# Patient Record
Sex: FEMALE | Race: WHITE | NOT HISPANIC OR LATINO | URBAN - METROPOLITAN AREA
[De-identification: names, ages, dates, MRNs, and addresses within clinical notes are randomized per-mention and may not be internally consistent; named-entity substitution may affect disease eponyms.]

---

## 2018-09-26 ENCOUNTER — CONVERSION ENCOUNTER (OUTPATIENT)
Dept: GENERAL RADIOLOGY | Facility: HOSPITAL | Age: 68
End: 2018-09-26

## 2019-03-27 ENCOUNTER — OFFICE VISIT CONVERTED (OUTPATIENT)
Dept: NEUROLOGY | Facility: CLINIC | Age: 69
End: 2019-03-27
Attending: PSYCHIATRY & NEUROLOGY

## 2019-03-27 ENCOUNTER — CONVERSION ENCOUNTER (OUTPATIENT)
Dept: NEUROLOGY | Facility: CLINIC | Age: 69
End: 2019-03-27

## 2019-03-28 ENCOUNTER — HOSPITAL ENCOUNTER (OUTPATIENT)
Dept: LAB | Facility: HOSPITAL | Age: 69
Discharge: HOME | End: 2019-03-28

## 2019-03-28 LAB
ALBUMIN SERPL-MCNC: 3.9 G/DL (ref 3.5–5)
ALBUMIN/GLOB SERPL: 1.4 {RATIO} (ref 1.4–2.6)
ALP SERPL-CCNC: 52 U/L (ref 43–160)
ALT SERPL-CCNC: 43 U/L (ref 10–40)
ANION GAP SERPL CALC-SCNC: 16 MMOL/L (ref 8–19)
AST SERPL-CCNC: 53 U/L (ref 15–50)
BASOPHILS # BLD AUTO: 0.03 10*3/UL (ref 0–0.2)
BASOPHILS NFR BLD AUTO: 0.4 % (ref 0–3)
BILIRUB SERPL-MCNC: 0.34 MG/DL (ref 0.2–1.3)
BUN SERPL-MCNC: 22 MG/DL (ref 5–25)
BUN/CREAT SERPL: 14 {RATIO} (ref 6–20)
CALCIUM SERPL-MCNC: 9.3 MG/DL (ref 8.7–10.4)
CHLORIDE SERPL-SCNC: 104 MMOL/L (ref 99–111)
CONV ABS IMM GRAN: 0.06 10*3/UL (ref 0–0.2)
CONV CO2: 25 MMOL/L (ref 22–32)
CONV IMMATURE GRAN: 0.8 % (ref 0–1.8)
CONV TOTAL PROTEIN: 6.6 G/DL (ref 6.3–8.2)
CREAT UR-MCNC: 1.58 MG/DL (ref 0.5–0.9)
DEPRECATED RDW RBC AUTO: 49.9 FL (ref 36.4–46.3)
EOSINOPHIL # BLD AUTO: 0.48 10*3/UL (ref 0–0.7)
EOSINOPHIL # BLD AUTO: 6.8 % (ref 0–7)
ERYTHROCYTE [DISTWIDTH] IN BLOOD BY AUTOMATED COUNT: 14.1 % (ref 11.7–14.4)
GFR SERPLBLD BASED ON 1.73 SQ M-ARVRAT: 33 ML/MIN/{1.73_M2}
GLOBULIN UR ELPH-MCNC: 2.7 G/DL (ref 2–3.5)
GLUCOSE SERPL-MCNC: 93 MG/DL (ref 65–99)
HBA1C MFR BLD: 11.6 G/DL (ref 12–16)
HCT VFR BLD AUTO: 37.3 % (ref 37–47)
HCYS SERPL-SCNC: 15.9 UMOL/L (ref 3.7–13.9)
LYMPHOCYTES # BLD AUTO: 0.38 10*3/UL (ref 1–5)
MCH RBC QN AUTO: 29.7 PG (ref 27–31)
MCHC RBC AUTO-ENTMCNC: 31.1 G/DL (ref 33–37)
MCV RBC AUTO: 95.6 FL (ref 81–99)
MONOCYTES # BLD AUTO: 0.45 10*3/UL (ref 0.2–1.2)
MONOCYTES NFR BLD AUTO: 6.3 % (ref 3–10)
NEUTROPHILS # BLD AUTO: 5.7 10*3/UL (ref 2–8)
NEUTROPHILS NFR BLD AUTO: 80.3 % (ref 30–85)
NRBC CBCN: 0 % (ref 0–0.7)
OSMOLALITY SERPL CALC.SUM OF ELEC: 295 MOSM/KG (ref 273–304)
PLATELET # BLD AUTO: 200 10*3/UL (ref 130–400)
PMV BLD AUTO: 12.4 FL (ref 9.4–12.3)
POTASSIUM SERPL-SCNC: 3.9 MMOL/L (ref 3.5–5.3)
RBC # BLD AUTO: 3.9 10*6/UL (ref 4.2–5.4)
SODIUM SERPL-SCNC: 141 MMOL/L (ref 135–147)
TSH SERPL-ACNC: 3.32 M[IU]/L (ref 0.27–4.2)
VARIANT LYMPHS NFR BLD MANUAL: 5.4 % (ref 20–45)
VIT B12 SERPL-MCNC: 1309 PG/ML (ref 211–911)
WBC # BLD AUTO: 7.1 10*3/UL (ref 4.8–10.8)

## 2019-03-31 ENCOUNTER — HOSPITAL ENCOUNTER (OUTPATIENT)
Dept: URGENT CARE | Facility: CLINIC | Age: 69
Discharge: HOME | End: 2019-03-31
Attending: NURSE PRACTITIONER

## 2019-04-10 ENCOUNTER — CONVERSION ENCOUNTER (OUTPATIENT)
Dept: SURGERY | Facility: CLINIC | Age: 69
End: 2019-04-10

## 2019-04-10 ENCOUNTER — OFFICE VISIT CONVERTED (OUTPATIENT)
Dept: UROLOGY | Facility: CLINIC | Age: 69
End: 2019-04-10
Attending: UROLOGY

## 2019-04-17 ENCOUNTER — HOSPITAL ENCOUNTER (OUTPATIENT)
Dept: CT IMAGING | Facility: HOSPITAL | Age: 69
Discharge: HOME | End: 2019-04-17
Attending: UROLOGY

## 2019-04-25 ENCOUNTER — PROCEDURE VISIT CONVERTED (OUTPATIENT)
Dept: UROLOGY | Facility: CLINIC | Age: 69
End: 2019-04-25
Attending: UROLOGY

## 2019-04-29 ENCOUNTER — OFFICE VISIT CONVERTED (OUTPATIENT)
Dept: FAMILY MEDICINE CLINIC | Facility: CLINIC | Age: 69
End: 2019-04-29
Attending: NURSE PRACTITIONER

## 2019-04-29 ENCOUNTER — CONVERSION ENCOUNTER (OUTPATIENT)
Dept: FAMILY MEDICINE CLINIC | Facility: CLINIC | Age: 69
End: 2019-04-29

## 2019-04-30 ENCOUNTER — HOSPITAL ENCOUNTER (OUTPATIENT)
Dept: PERIOP | Facility: HOSPITAL | Age: 69
Setting detail: HOSPITAL OUTPATIENT SURGERY
Discharge: HOME | End: 2019-04-30
Attending: UROLOGY

## 2019-05-08 ENCOUNTER — CONVERSION ENCOUNTER (OUTPATIENT)
Dept: OTHER | Facility: HOSPITAL | Age: 69
End: 2019-05-08

## 2019-05-08 ENCOUNTER — HOSPITAL ENCOUNTER (OUTPATIENT)
Dept: GENERAL RADIOLOGY | Facility: HOSPITAL | Age: 69
Discharge: HOME | End: 2019-05-08
Attending: UROLOGY

## 2019-05-08 ENCOUNTER — OFFICE VISIT CONVERTED (OUTPATIENT)
Dept: UROLOGY | Facility: CLINIC | Age: 69
End: 2019-05-08
Attending: UROLOGY

## 2019-05-15 ENCOUNTER — HOSPITAL ENCOUNTER (OUTPATIENT)
Dept: PERIOP | Facility: HOSPITAL | Age: 69
Setting detail: HOSPITAL OUTPATIENT SURGERY
Discharge: HOME | End: 2019-05-15
Attending: UROLOGY

## 2019-05-23 ENCOUNTER — OFFICE VISIT CONVERTED (OUTPATIENT)
Dept: UROLOGY | Facility: CLINIC | Age: 69
End: 2019-05-23
Attending: UROLOGY

## 2019-06-11 ENCOUNTER — HOSPITAL ENCOUNTER (OUTPATIENT)
Dept: OTHER | Facility: HOSPITAL | Age: 69
Setting detail: RECURRING SERIES
Discharge: HOME | End: 2019-06-30
Attending: INTERNAL MEDICINE

## 2019-06-11 LAB
ALBUMIN SERPL-MCNC: 3.9 G/DL (ref 3.5–5)
ALBUMIN/GLOB SERPL: 1.4 {RATIO} (ref 1.4–2.6)
ALP SERPL-CCNC: 47 U/L (ref 43–160)
ALT SERPL-CCNC: 15 U/L (ref 10–40)
ANION GAP SERPL CALC-SCNC: 15 MMOL/L (ref 8–19)
AST SERPL-CCNC: 19 U/L (ref 15–50)
BASOPHILS # BLD AUTO: 0.04 10*3/UL (ref 0–0.2)
BASOPHILS NFR BLD AUTO: 0.5 % (ref 0–3)
BILIRUB SERPL-MCNC: 0.29 MG/DL (ref 0.2–1.3)
BUN SERPL-MCNC: 19 MG/DL (ref 5–25)
BUN/CREAT SERPL: 23 {RATIO} (ref 6–20)
CALCIUM SERPL-MCNC: 9.1 MG/DL (ref 8.7–10.4)
CHLORIDE SERPL-SCNC: 106 MMOL/L (ref 99–111)
CONV ABS IMM GRAN: 0.03 10*3/UL (ref 0–0.2)
CONV CO2: 27 MMOL/L (ref 22–32)
CONV IMMATURE GRAN: 0.4 % (ref 0–1.8)
CONV TOTAL PROTEIN: 6.6 G/DL (ref 6.3–8.2)
CREAT UR-MCNC: 0.83 MG/DL (ref 0.5–0.9)
DEPRECATED RDW RBC AUTO: 48.3 FL (ref 36.4–46.3)
EOSINOPHIL # BLD AUTO: 0.13 10*3/UL (ref 0–0.7)
EOSINOPHIL # BLD AUTO: 1.7 % (ref 0–7)
ERYTHROCYTE [DISTWIDTH] IN BLOOD BY AUTOMATED COUNT: 13.6 % (ref 11.7–14.4)
GFR SERPLBLD BASED ON 1.73 SQ M-ARVRAT: >60 ML/MIN/{1.73_M2}
GLOBULIN UR ELPH-MCNC: 2.7 G/DL (ref 2–3.5)
GLUCOSE SERPL-MCNC: 90 MG/DL (ref 65–99)
HBA1C MFR BLD: 11.1 G/DL (ref 12–16)
HCT VFR BLD AUTO: 35.3 % (ref 37–47)
LYMPHOCYTES # BLD AUTO: 1.86 10*3/UL (ref 1–5)
MCH RBC QN AUTO: 30.2 PG (ref 27–31)
MCHC RBC AUTO-ENTMCNC: 31.4 G/DL (ref 33–37)
MCV RBC AUTO: 96.2 FL (ref 81–99)
MONOCYTES # BLD AUTO: 0.54 10*3/UL (ref 0.2–1.2)
MONOCYTES NFR BLD AUTO: 7.1 % (ref 3–10)
NEUTROPHILS # BLD AUTO: 4.99 10*3/UL (ref 2–8)
NEUTROPHILS NFR BLD AUTO: 65.8 % (ref 30–85)
NRBC CBCN: 0 % (ref 0–0.7)
OSMOLALITY SERPL CALC.SUM OF ELEC: 300 MOSM/KG (ref 273–304)
PLATELET # BLD AUTO: 220 10*3/UL (ref 130–400)
PMV BLD AUTO: 10.9 FL (ref 9.4–12.3)
POTASSIUM SERPL-SCNC: 4.2 MMOL/L (ref 3.5–5.3)
RBC # BLD AUTO: 3.67 10*6/UL (ref 4.2–5.4)
SODIUM SERPL-SCNC: 144 MMOL/L (ref 135–147)
VARIANT LYMPHS NFR BLD MANUAL: 24.5 % (ref 20–45)
WBC # BLD AUTO: 7.59 10*3/UL (ref 4.8–10.8)

## 2019-06-12 ENCOUNTER — HOSPITAL ENCOUNTER (OUTPATIENT)
Dept: GENERAL RADIOLOGY | Facility: HOSPITAL | Age: 69
Discharge: HOME | End: 2019-06-12
Attending: NURSE PRACTITIONER

## 2019-06-19 LAB
ALBUMIN SERPL-MCNC: 4 G/DL (ref 3.5–5)
ALBUMIN/GLOB SERPL: 1.6 {RATIO} (ref 1.4–2.6)
ALP SERPL-CCNC: 48 U/L (ref 43–160)
ALT SERPL-CCNC: 13 U/L (ref 10–40)
ANION GAP SERPL CALC-SCNC: 15 MMOL/L (ref 8–19)
AST SERPL-CCNC: 20 U/L (ref 15–50)
BASOPHILS # BLD AUTO: 0.02 10*3/UL (ref 0–0.2)
BASOPHILS NFR BLD AUTO: 0.3 % (ref 0–3)
BILIRUB SERPL-MCNC: 0.35 MG/DL (ref 0.2–1.3)
BUN SERPL-MCNC: 17 MG/DL (ref 5–25)
BUN/CREAT SERPL: 18 {RATIO} (ref 6–20)
CALCIUM SERPL-MCNC: 9.4 MG/DL (ref 8.7–10.4)
CHLORIDE SERPL-SCNC: 108 MMOL/L (ref 99–111)
CONV ABS IMM GRAN: 0.02 10*3/UL (ref 0–0.54)
CONV CO2: 25 MMOL/L (ref 22–32)
CONV EOSINOPHILS PERCENT BY MANUAL COUNT: 2.3 % (ref 0–7)
CONV IMMATURE GRAN: 0.3 % (ref 0–0.4)
CONV TOTAL PROTEIN: 6.5 G/DL (ref 6.3–8.2)
CREAT UR-MCNC: 0.93 MG/DL (ref 0.5–0.9)
EOSINOPHIL # BLD MANUAL: 0.15 10*3/UL (ref 0–0.7)
ERYTHROCYTE [DISTWIDTH] IN BLOOD BY AUTOMATED COUNT: 13.5 % (ref 11.5–14.5)
ERYTHROCYTE [DISTWIDTH] IN BLOOD BY AUTOMATED COUNT: 47.5 FL
GFR SERPLBLD BASED ON 1.73 SQ M-ARVRAT: >60 ML/MIN/{1.73_M2}
GLOBULIN UR ELPH-MCNC: 2.5 G/DL (ref 2–3.5)
GLUCOSE SERPL-MCNC: 97 MG/DL (ref 65–99)
HBA1C MFR BLD: 10.9 G/DL (ref 12–16)
HCT VFR BLD AUTO: 33.9 % (ref 37–47)
LYMPHOCYTES # BLD AUTO: 1.62 10*3/UL (ref 1–5)
LYMPHOCYTES NFR BLD AUTO: 24.6 % (ref 20–45)
MCH RBC QN AUTO: 30.4 PG (ref 27–31)
MCHC RBC AUTO-ENTMCNC: 32.2 G/DL (ref 33–37)
MCV RBC AUTO: 94.4 FL (ref 81–99)
MONOCYTES # BLD AUTO: 0.53 10*3/UL (ref 0.2–1.2)
MONOCYTES NFR BLD MANUAL: 8.1 % (ref 3–10)
NEUTROPHILS # BLD AUTO: 4.24 10*3/UL (ref 2–8)
NEUTROPHILS NFR BLD MANUAL: 64.4 % (ref 30–85)
OSMOLALITY SERPL CALC.SUM OF ELEC: 299 MOSM/KG (ref 273–304)
PLATELET # BLD AUTO: 223 10*3/UL (ref 130–400)
PMV BLD AUTO: 11 FL (ref 7.4–10.4)
POTASSIUM SERPL-SCNC: 3.9 MMOL/L (ref 3.5–5.3)
RBC MORPH BLD: 3.59 10*6/UL (ref 4.2–5.4)
SODIUM SERPL-SCNC: 144 MMOL/L (ref 135–147)
WBC # BLD AUTO: 6.58 10*3/UL (ref 4.8–10.8)

## 2019-06-26 LAB
BASOPHILS # BLD AUTO: 0.01 10*3/UL (ref 0–0.2)
BASOPHILS NFR BLD AUTO: 0.3 % (ref 0–3)
CONV ABS IMM GRAN: 0.01 10*3/UL (ref 0–0.54)
CONV EOSINOPHILS PERCENT BY MANUAL COUNT: 1.6 % (ref 0–7)
CONV IMMATURE GRAN: 0.3 % (ref 0–0.4)
EOSINOPHIL # BLD MANUAL: 0.06 10*3/UL (ref 0–0.7)
ERYTHROCYTE [DISTWIDTH] IN BLOOD BY AUTOMATED COUNT: 12.9 % (ref 11.5–14.5)
ERYTHROCYTE [DISTWIDTH] IN BLOOD BY AUTOMATED COUNT: 45.9 FL
HBA1C MFR BLD: 11.3 G/DL (ref 12–16)
HCT VFR BLD AUTO: 36.1 % (ref 37–47)
LYMPHOCYTES # BLD AUTO: 1.59 10*3/UL (ref 1–5)
LYMPHOCYTES NFR BLD AUTO: 42.7 % (ref 20–45)
MCH RBC QN AUTO: 29.7 PG (ref 27–31)
MCHC RBC AUTO-ENTMCNC: 31.3 G/DL (ref 33–37)
MCV RBC AUTO: 95 FL (ref 81–99)
MONOCYTES # BLD AUTO: 0.13 10*3/UL (ref 0.2–1.2)
MONOCYTES NFR BLD MANUAL: 3.5 % (ref 3–10)
NEUTROPHILS # BLD AUTO: 1.92 10*3/UL (ref 2–8)
NEUTROPHILS NFR BLD MANUAL: 51.6 % (ref 30–85)
PLATELET # BLD AUTO: 108 10*3/UL (ref 130–400)
PMV BLD AUTO: 12 FL (ref 7.4–10.4)
RBC MORPH BLD: 3.8 10*6/UL (ref 4.2–5.4)
WBC # BLD AUTO: 3.72 10*3/UL (ref 4.8–10.8)

## 2019-07-10 ENCOUNTER — HOSPITAL ENCOUNTER (OUTPATIENT)
Dept: OTHER | Facility: HOSPITAL | Age: 69
Setting detail: RECURRING SERIES
Discharge: HOME | End: 2019-07-31
Attending: INTERNAL MEDICINE

## 2019-07-10 LAB
ALBUMIN SERPL-MCNC: 4.2 G/DL (ref 3.5–5)
ALBUMIN/GLOB SERPL: 1.6 {RATIO} (ref 1.4–2.6)
ALP SERPL-CCNC: 54 U/L (ref 43–160)
ALT SERPL-CCNC: 20 U/L (ref 10–40)
ANION GAP SERPL CALC-SCNC: 14 MMOL/L (ref 8–19)
AST SERPL-CCNC: 23 U/L (ref 15–50)
BASOPHILS # BLD AUTO: 0.02 10*3/UL (ref 0–0.2)
BASOPHILS NFR BLD AUTO: 0.7 % (ref 0–3)
BILIRUB SERPL-MCNC: <0.15 MG/DL (ref 0.2–1.3)
BUN SERPL-MCNC: 12 MG/DL (ref 5–25)
BUN/CREAT SERPL: 10 {RATIO} (ref 6–20)
CALCIUM SERPL-MCNC: 9.3 MG/DL (ref 8.7–10.4)
CHLORIDE SERPL-SCNC: 108 MMOL/L (ref 99–111)
CONV ABS IMM GRAN: 0.01 10*3/UL (ref 0–0.2)
CONV CO2: 27 MMOL/L (ref 22–32)
CONV IMMATURE GRAN: 0.4 % (ref 0–1.8)
CONV TOTAL PROTEIN: 6.8 G/DL (ref 6.3–8.2)
CREAT UR-MCNC: 1.15 MG/DL (ref 0.5–0.9)
DEPRECATED RDW RBC AUTO: 43.6 FL (ref 36.4–46.3)
EOSINOPHIL # BLD AUTO: 0.13 10*3/UL (ref 0–0.7)
EOSINOPHIL # BLD AUTO: 4.7 % (ref 0–7)
ERYTHROCYTE [DISTWIDTH] IN BLOOD BY AUTOMATED COUNT: 13 % (ref 11.7–14.4)
GFR SERPLBLD BASED ON 1.73 SQ M-ARVRAT: 48 ML/MIN/{1.73_M2}
GLOBULIN UR ELPH-MCNC: 2.6 G/DL (ref 2–3.5)
GLUCOSE SERPL-MCNC: 91 MG/DL (ref 65–99)
HBA1C MFR BLD: 9.5 G/DL (ref 12–16)
HCT VFR BLD AUTO: 29.9 % (ref 37–47)
LYMPHOCYTES # BLD AUTO: 1.48 10*3/UL (ref 1–5)
MCH RBC QN AUTO: 30.3 PG (ref 27–31)
MCHC RBC AUTO-ENTMCNC: 31.8 G/DL (ref 33–37)
MCV RBC AUTO: 95.2 FL (ref 81–99)
MONOCYTES # BLD AUTO: 0.49 10*3/UL (ref 0.2–1.2)
MONOCYTES NFR BLD AUTO: 17.7 % (ref 3–10)
NEUTROPHILS # BLD AUTO: 0.64 10*3/UL (ref 2–8)
NEUTROPHILS NFR BLD AUTO: 23.1 % (ref 30–85)
NRBC CBCN: 0 % (ref 0–0.7)
OSMOLALITY SERPL CALC.SUM OF ELEC: 299 MOSM/KG (ref 273–304)
PLATELET # BLD AUTO: 374 10*3/UL (ref 130–400)
PMV BLD AUTO: 10.5 FL (ref 9.4–12.3)
POTASSIUM SERPL-SCNC: 3.8 MMOL/L (ref 3.5–5.3)
RBC # BLD AUTO: 3.14 10*6/UL (ref 4.2–5.4)
SODIUM SERPL-SCNC: 145 MMOL/L (ref 135–147)
VARIANT LYMPHS NFR BLD MANUAL: 53.4 % (ref 20–45)
WBC # BLD AUTO: 2.77 10*3/UL (ref 4.8–10.8)

## 2019-07-17 LAB
ALBUMIN SERPL-MCNC: 4.3 G/DL (ref 3.5–5)
ALBUMIN/GLOB SERPL: 1.8 {RATIO} (ref 1.4–2.6)
ALP SERPL-CCNC: 50 U/L (ref 43–160)
ALT SERPL-CCNC: 16 U/L (ref 10–40)
ANION GAP SERPL CALC-SCNC: 17 MMOL/L (ref 8–19)
AST SERPL-CCNC: 25 U/L (ref 15–50)
BASOPHILS # BLD AUTO: 0.04 10*3/UL (ref 0–0.2)
BASOPHILS NFR BLD AUTO: 0.6 % (ref 0–3)
BILIRUB SERPL-MCNC: <0.15 MG/DL (ref 0.2–1.3)
BUN SERPL-MCNC: 12 MG/DL (ref 5–25)
BUN/CREAT SERPL: 13 {RATIO} (ref 6–20)
CALCIUM SERPL-MCNC: 9.5 MG/DL (ref 8.7–10.4)
CHLORIDE SERPL-SCNC: 105 MMOL/L (ref 99–111)
CONV ABS IMM GRAN: 0.34 10*3/UL (ref 0–0.54)
CONV CO2: 23 MMOL/L (ref 22–32)
CONV EOSINOPHILS PERCENT BY MANUAL COUNT: 1.8 % (ref 0–7)
CONV IMMATURE GRAN: 5.2 % (ref 0–0.4)
CONV TOTAL PROTEIN: 6.7 G/DL (ref 6.3–8.2)
CREAT UR-MCNC: 0.96 MG/DL (ref 0.5–0.9)
EOSINOPHIL # BLD MANUAL: 0.12 10*3/UL (ref 0–0.7)
ERYTHROCYTE [DISTWIDTH] IN BLOOD BY AUTOMATED COUNT: 13.8 % (ref 11.5–14.5)
ERYTHROCYTE [DISTWIDTH] IN BLOOD BY AUTOMATED COUNT: 46.1 FL
GFR SERPLBLD BASED ON 1.73 SQ M-ARVRAT: 60 ML/MIN/{1.73_M2}
GLOBULIN UR ELPH-MCNC: 2.4 G/DL (ref 2–3.5)
GLUCOSE SERPL-MCNC: 91 MG/DL (ref 65–99)
HBA1C MFR BLD: 10.2 G/DL (ref 12–16)
HCT VFR BLD AUTO: 31.9 % (ref 37–47)
LYMPHOCYTES # BLD AUTO: 2.24 10*3/UL (ref 1–5)
LYMPHOCYTES NFR BLD AUTO: 34.5 % (ref 20–45)
MCH RBC QN AUTO: 30.4 PG (ref 27–31)
MCHC RBC AUTO-ENTMCNC: 32 G/DL (ref 33–37)
MCV RBC AUTO: 94.9 FL (ref 81–99)
MONOCYTES # BLD AUTO: 1.29 10*3/UL (ref 0.2–1.2)
MONOCYTES NFR BLD MANUAL: 19.8 % (ref 3–10)
NEUTROPHILS # BLD AUTO: 2.47 10*3/UL (ref 2–8)
NEUTROPHILS NFR BLD MANUAL: 38.1 % (ref 30–85)
OSMOLALITY SERPL CALC.SUM OF ELEC: 291 MOSM/KG (ref 273–304)
PLATELET # BLD AUTO: 466 10*3/UL (ref 130–400)
PMV BLD AUTO: 9.9 FL (ref 7.4–10.4)
POTASSIUM SERPL-SCNC: 4.2 MMOL/L (ref 3.5–5.3)
RBC MORPH BLD: 3.36 10*6/UL (ref 4.2–5.4)
SODIUM SERPL-SCNC: 141 MMOL/L (ref 135–147)
WBC # BLD AUTO: 6.5 10*3/UL (ref 4.8–10.8)

## 2019-07-24 LAB
BASOPHILS # BLD AUTO: 0.02 10*3/UL (ref 0–0.2)
BASOPHILS NFR BLD AUTO: 0.6 % (ref 0–3)
CONV ABS IMM GRAN: 0.01 10*3/UL (ref 0–0.54)
CONV EOSINOPHILS PERCENT BY MANUAL COUNT: 0.9 % (ref 0–7)
CONV IMMATURE GRAN: 0.3 % (ref 0–0.4)
EOSINOPHIL # BLD MANUAL: 0.03 10*3/UL (ref 0–0.7)
ERYTHROCYTE [DISTWIDTH] IN BLOOD BY AUTOMATED COUNT: 13.5 % (ref 11.5–14.5)
ERYTHROCYTE [DISTWIDTH] IN BLOOD BY AUTOMATED COUNT: 45.6 FL
HBA1C MFR BLD: 11 G/DL (ref 12–16)
HCT VFR BLD AUTO: 34.3 % (ref 37–47)
LYMPHOCYTES # BLD AUTO: 1.47 10*3/UL (ref 1–5)
LYMPHOCYTES NFR BLD AUTO: 44.1 % (ref 20–45)
MCH RBC QN AUTO: 29.7 PG (ref 27–31)
MCHC RBC AUTO-ENTMCNC: 32.1 G/DL (ref 33–37)
MCV RBC AUTO: 92.7 FL (ref 81–99)
MONOCYTES # BLD AUTO: 0.15 10*3/UL (ref 0.2–1.2)
MONOCYTES NFR BLD MANUAL: 4.5 % (ref 3–10)
NEUTROPHILS # BLD AUTO: 1.65 10*3/UL (ref 2–8)
NEUTROPHILS NFR BLD MANUAL: 49.6 % (ref 30–85)
PLATELET # BLD AUTO: 197 10*3/UL (ref 130–400)
PMV BLD AUTO: 11 FL (ref 7.4–10.4)
RBC MORPH BLD: 3.7 10*6/UL (ref 4.2–5.4)
WBC # BLD AUTO: 3.33 10*3/UL (ref 4.8–10.8)

## 2019-08-08 ENCOUNTER — HOSPITAL ENCOUNTER (OUTPATIENT)
Dept: CARDIOLOGY | Facility: HOSPITAL | Age: 69
Discharge: HOME | End: 2019-08-08
Attending: NURSE PRACTITIONER

## 2019-08-08 ENCOUNTER — HOSPITAL ENCOUNTER (OUTPATIENT)
Dept: OTHER | Facility: HOSPITAL | Age: 69
Setting detail: RECURRING SERIES
Discharge: HOME | End: 2019-08-31
Attending: INTERNAL MEDICINE

## 2019-08-08 LAB
ALBUMIN SERPL-MCNC: 4 G/DL (ref 3.5–5)
ALBUMIN/GLOB SERPL: 1.7 {RATIO} (ref 1.4–2.6)
ALP SERPL-CCNC: 110 U/L (ref 43–160)
ALT SERPL-CCNC: 9 U/L (ref 10–40)
ANION GAP SERPL CALC-SCNC: 14 MMOL/L (ref 8–19)
AST SERPL-CCNC: 19 U/L (ref 15–50)
BASOPHILS # BLD AUTO: 0.16 10*3/UL (ref 0–0.2)
BASOPHILS # BLD: 2 % (ref 0–3)
BASOPHILS NFR BLD AUTO: 0.8 % (ref 0–3)
BILIRUB SERPL-MCNC: 0.23 MG/DL (ref 0.2–1.3)
BUN SERPL-MCNC: 14 MG/DL (ref 5–25)
BUN/CREAT SERPL: 11 {RATIO} (ref 6–20)
C3 FRG RBC-MCNC: ABNORMAL
CALCIUM SERPL-MCNC: 9 MG/DL (ref 8.7–10.4)
CHLORIDE SERPL-SCNC: 106 MMOL/L (ref 99–111)
CONV ABS BANDS: 0 % (ref 1–5)
CONV ABS IMM GRAN: 1.89 10*3/UL (ref 0–0.2)
CONV ANISOCYTES: SLIGHT
CONV ATYPICAL LYMPHOCYTES: 3 % (ref 0–5)
CONV CO2: 26 MMOL/L (ref 22–32)
CONV IMMATURE GRAN: 9.3 % (ref 0–1.8)
CONV SEGMENTED NEUTROPHILS: 76 % (ref 45–70)
CONV TOTAL PROTEIN: 6.3 G/DL (ref 6.3–8.2)
CREAT UR-MCNC: 1.3 MG/DL (ref 0.5–0.9)
DEPRECATED RDW RBC AUTO: 48.9 FL (ref 36.4–46.3)
EOSINOPHIL # BLD AUTO: 0.11 10*3/UL (ref 0–0.7)
EOSINOPHIL # BLD AUTO: 0.5 % (ref 0–7)
EOSINOPHIL NFR BLD AUTO: 2 % (ref 0–7)
ERYTHROCYTE [DISTWIDTH] IN BLOOD BY AUTOMATED COUNT: 15.3 % (ref 11.7–14.4)
GFR SERPLBLD BASED ON 1.73 SQ M-ARVRAT: 41 ML/MIN/{1.73_M2}
GLOBULIN UR ELPH-MCNC: 2.3 G/DL (ref 2–3.5)
GLUCOSE SERPL-MCNC: 104 MG/DL (ref 65–99)
HCT VFR BLD AUTO: 26 % (ref 37–47)
HGB BLD-MCNC: 8.4 G/DL (ref 12–16)
LYMPHOCYTES # BLD AUTO: 2.1 10*3/UL (ref 1–5)
LYMPHOCYTES NFR BLD AUTO: 10.3 % (ref 20–45)
MCH RBC QN AUTO: 29.8 PG (ref 27–31)
MCHC RBC AUTO-ENTMCNC: 32.3 G/DL (ref 33–37)
MCV RBC AUTO: 92.2 FL (ref 81–99)
METAMYELOCYTES NFR BLD MANUAL: 2 %
MONOCYTES # BLD AUTO: 2.22 10*3/UL (ref 0.2–1.2)
MONOCYTES NFR BLD AUTO: 10.9 % (ref 3–10)
MONOCYTES NFR BLD MANUAL: 5 % (ref 3–10)
MYELOCYTES TOTAL PER COUNTED LEUKOCYTES BY MANUAL COUN: 3 %
NEUTROPHILS # BLD AUTO: 13.89 10*3/UL (ref 2–8)
NEUTROPHILS NFR BLD AUTO: 68.2 % (ref 30–85)
NRBC CBCN: 0.1 % (ref 0–0.7)
NUC CELL # PRT MANUAL: 1 /100{WBCS}
OSMOLALITY SERPL CALC.SUM OF ELEC: 295 MOSM/KG (ref 273–304)
OVALOCYTES BLD QL SMEAR: SLIGHT
PLAT MORPH BLD: NORMAL
PLATELET # BLD AUTO: 273 10*3/UL (ref 130–400)
PMV BLD AUTO: 10.7 FL (ref 9.4–12.3)
POIKILOCYTOSIS BLD QL SMEAR: SLIGHT
POLYCHROMASIA BLD QL SMEAR: SLIGHT
POTASSIUM SERPL-SCNC: 4 MMOL/L (ref 3.5–5.3)
RBC # BLD AUTO: 2.82 10*6/UL (ref 4.2–5.4)
SMALL PLATELETS BLD QL SMEAR: ADEQUATE
SODIUM SERPL-SCNC: 142 MMOL/L (ref 135–147)
STOMATOCYTES BLD QL SMEAR: SLIGHT
VARIANT LYMPHS NFR BLD MANUAL: 7 % (ref 20–45)
WBC # BLD AUTO: 20.37 10*3/UL (ref 4.8–10.8)

## 2019-08-14 LAB
ABO GROUP BLD: NORMAL
BASOPHILS # BLD AUTO: 0.05 10*3/UL (ref 0–0.2)
BASOPHILS NFR BLD AUTO: 1.6 % (ref 0–3)
BLD GP AB SCN SERPL QL: NORMAL
CONV ABD CONTROL: NORMAL
CONV ABS IMM GRAN: 0.01 10*3/UL (ref 0–0.54)
CONV EOSINOPHILS PERCENT BY MANUAL COUNT: 1.9 % (ref 0–7)
CONV IMMATURE GRAN: 0.3 % (ref 0–0.4)
EOSINOPHIL # BLD MANUAL: 0.06 10*3/UL (ref 0–0.7)
ERYTHROCYTE [DISTWIDTH] IN BLOOD BY AUTOMATED COUNT: 15.3 % (ref 11.5–14.5)
ERYTHROCYTE [DISTWIDTH] IN BLOOD BY AUTOMATED COUNT: 50 FL
HBA1C MFR BLD: 7.6 G/DL (ref 12–16)
HCT VFR BLD AUTO: 23.5 % (ref 37–47)
LYMPHOCYTES # BLD AUTO: 1.12 10*3/UL (ref 1–5)
LYMPHOCYTES NFR BLD AUTO: 36.2 % (ref 20–45)
MCH RBC QN AUTO: 30 PG (ref 27–31)
MCHC RBC AUTO-ENTMCNC: 32.3 G/DL (ref 33–37)
MCV RBC AUTO: 92.9 FL (ref 81–99)
MONOCYTES # BLD AUTO: 0.17 10*3/UL (ref 0.2–1.2)
MONOCYTES NFR BLD MANUAL: 5.5 % (ref 3–10)
NEUTROPHILS # BLD AUTO: 1.68 10*3/UL (ref 2–8)
NEUTROPHILS NFR BLD MANUAL: 54.5 % (ref 30–85)
PLATELET # BLD AUTO: 246 10*3/UL (ref 130–400)
PMV BLD AUTO: 10.4 FL (ref 7.4–10.4)
RBC MORPH BLD: 2.53 10*6/UL (ref 4.2–5.4)
RH BLD: NORMAL
WBC # BLD AUTO: 3.09 10*3/UL (ref 4.8–10.8)

## 2019-08-16 ENCOUNTER — HOSPITAL ENCOUNTER (OUTPATIENT)
Dept: INFUSION THERAPY | Facility: HOSPITAL | Age: 69
Discharge: HOME | End: 2019-08-16
Attending: NURSE PRACTITIONER

## 2019-08-26 ENCOUNTER — OFFICE VISIT CONVERTED (OUTPATIENT)
Dept: FAMILY MEDICINE CLINIC | Facility: CLINIC | Age: 69
End: 2019-08-26
Attending: NURSE PRACTITIONER

## 2019-08-26 ENCOUNTER — CONVERSION ENCOUNTER (OUTPATIENT)
Dept: FAMILY MEDICINE CLINIC | Facility: CLINIC | Age: 69
End: 2019-08-26

## 2019-09-04 ENCOUNTER — HOSPITAL ENCOUNTER (OUTPATIENT)
Dept: ONCOLOGY | Facility: HOSPITAL | Age: 69
Setting detail: RECURRING SERIES
Discharge: HOME | End: 2019-09-30
Attending: INTERNAL MEDICINE

## 2019-09-04 LAB
ALBUMIN SERPL-MCNC: 3.9 G/DL (ref 3.5–5)
ALBUMIN/GLOB SERPL: 2 {RATIO} (ref 1.4–2.6)
ALP SERPL-CCNC: 65 U/L (ref 43–160)
ALT SERPL-CCNC: 13 U/L (ref 10–40)
ANION GAP SERPL CALC-SCNC: 16 MMOL/L (ref 8–19)
AST SERPL-CCNC: 29 U/L (ref 15–50)
BASOPHILS # BLD AUTO: 0.05 10*3/UL (ref 0–0.2)
BASOPHILS NFR BLD AUTO: 0.7 % (ref 0–3)
BILIRUB SERPL-MCNC: 0.69 MG/DL (ref 0.2–1.3)
BUN SERPL-MCNC: 19 MG/DL (ref 5–25)
BUN/CREAT SERPL: 17 {RATIO} (ref 6–20)
CALCIUM SERPL-MCNC: 9.2 MG/DL (ref 8.7–10.4)
CHLORIDE SERPL-SCNC: 102 MMOL/L (ref 99–111)
CONV ABS IMM GRAN: 0.09 10*3/UL (ref 0–0.54)
CONV CO2: 24 MMOL/L (ref 22–32)
CONV EOSINOPHILS PERCENT BY MANUAL COUNT: 0.7 % (ref 0–7)
CONV IMMATURE GRAN: 1.2 % (ref 0–0.4)
CONV TOTAL PROTEIN: 5.9 G/DL (ref 6.3–8.2)
CREAT UR-MCNC: 1.12 MG/DL (ref 0.5–0.9)
EOSINOPHIL # BLD MANUAL: 0.05 10*3/UL (ref 0–0.7)
ERYTHROCYTE [DISTWIDTH] IN BLOOD BY AUTOMATED COUNT: 17.6 % (ref 11.5–14.5)
ERYTHROCYTE [DISTWIDTH] IN BLOOD BY AUTOMATED COUNT: 59.5 FL
GFR SERPLBLD BASED ON 1.73 SQ M-ARVRAT: 49 ML/MIN/{1.73_M2}
GLOBULIN UR ELPH-MCNC: 2 G/DL (ref 2–3.5)
GLUCOSE SERPL-MCNC: 95 MG/DL (ref 65–99)
HBA1C MFR BLD: 8.2 G/DL (ref 12–16)
HCT VFR BLD AUTO: 25.2 % (ref 37–47)
LYMPHOCYTES # BLD AUTO: 1.25 10*3/UL (ref 1–5)
LYMPHOCYTES NFR BLD AUTO: 17.1 % (ref 20–45)
MCH RBC QN AUTO: 30.8 PG (ref 27–31)
MCHC RBC AUTO-ENTMCNC: 32.5 G/DL (ref 33–37)
MCV RBC AUTO: 94.7 FL (ref 81–99)
MONOCYTES # BLD AUTO: 1.35 10*3/UL (ref 0.2–1.2)
MONOCYTES NFR BLD MANUAL: 18.5 % (ref 3–10)
NEUTROPHILS # BLD AUTO: 4.52 10*3/UL (ref 2–8)
NEUTROPHILS NFR BLD MANUAL: 61.8 % (ref 30–85)
OSMOLALITY SERPL CALC.SUM OF ELEC: 288 MOSM/KG (ref 273–304)
PLATELET # BLD AUTO: 174 10*3/UL (ref 130–400)
PMV BLD AUTO: 10.2 FL (ref 7.4–10.4)
POTASSIUM SERPL-SCNC: 4.2 MMOL/L (ref 3.5–5.3)
RBC MORPH BLD: 2.66 10*6/UL (ref 4.2–5.4)
SODIUM SERPL-SCNC: 138 MMOL/L (ref 135–147)
WBC # BLD AUTO: 7.31 10*3/UL (ref 4.8–10.8)

## 2019-09-11 LAB
BASOPHILS # BLD AUTO: 0.03 10*3/UL (ref 0–0.2)
BASOPHILS NFR BLD AUTO: 1 % (ref 0–3)
CONV ABS IMM GRAN: 0.01 10*3/UL (ref 0–0.54)
CONV EOSINOPHILS PERCENT BY MANUAL COUNT: 0.7 % (ref 0–7)
CONV IMMATURE GRAN: 0.3 % (ref 0–0.4)
EOSINOPHIL # BLD MANUAL: 0.02 10*3/UL (ref 0–0.7)
ERYTHROCYTE [DISTWIDTH] IN BLOOD BY AUTOMATED COUNT: 17.4 % (ref 11.5–14.5)
ERYTHROCYTE [DISTWIDTH] IN BLOOD BY AUTOMATED COUNT: 61.3 FL
HBA1C MFR BLD: 8.1 G/DL (ref 12–16)
HCT VFR BLD AUTO: 25.6 % (ref 37–47)
LYMPHOCYTES # BLD AUTO: 0.87 10*3/UL (ref 1–5)
LYMPHOCYTES NFR BLD AUTO: 29.3 % (ref 20–45)
MCH RBC QN AUTO: 30.7 PG (ref 27–31)
MCHC RBC AUTO-ENTMCNC: 31.6 G/DL (ref 33–37)
MCV RBC AUTO: 97 FL (ref 81–99)
MONOCYTES # BLD AUTO: 0.16 10*3/UL (ref 0.2–1.2)
MONOCYTES NFR BLD MANUAL: 5.4 % (ref 3–10)
NEUTROPHILS # BLD AUTO: 1.88 10*3/UL (ref 2–8)
NEUTROPHILS NFR BLD MANUAL: 63.3 % (ref 30–85)
PLATELET # BLD AUTO: 84 10*3/UL (ref 130–400)
PMV BLD AUTO: 11.1 FL (ref 7.4–10.4)
RBC MORPH BLD: 2.64 10*6/UL (ref 4.2–5.4)
WBC # BLD AUTO: 2.97 10*3/UL (ref 4.8–10.8)

## 2019-09-18 LAB
ALBUMIN SERPL-MCNC: 4.2 G/DL (ref 3.5–5)
ALBUMIN/GLOB SERPL: 2 {RATIO} (ref 1.4–2.6)
ALP SERPL-CCNC: 49 U/L (ref 43–160)
ALT SERPL-CCNC: 12 U/L (ref 10–40)
ANION GAP SERPL CALC-SCNC: 15 MMOL/L (ref 8–19)
AST SERPL-CCNC: 21 U/L (ref 15–50)
BASOPHILS # BLD AUTO: 0.03 10*3/UL (ref 0–0.2)
BASOPHILS NFR BLD AUTO: 0.9 % (ref 0–3)
BILIRUB SERPL-MCNC: 0.42 MG/DL (ref 0.2–1.3)
BUN SERPL-MCNC: 21 MG/DL (ref 5–25)
BUN/CREAT SERPL: 11 {RATIO} (ref 6–20)
CALCIUM SERPL-MCNC: 9.2 MG/DL (ref 8.7–10.4)
CHLORIDE SERPL-SCNC: 104 MMOL/L (ref 99–111)
CONV ABS IMM GRAN: 0 10*3/UL (ref 0–0.2)
CONV ANISOCYTES: SLIGHT
CONV CO2: 22 MMOL/L (ref 22–32)
CONV IMMATURE GRAN: 0 % (ref 0–1.8)
CONV TOTAL PROTEIN: 6.3 G/DL (ref 6.3–8.2)
CREAT UR-MCNC: 1.84 MG/DL (ref 0.5–0.9)
DEPRECATED RDW RBC AUTO: 66.2 FL (ref 36.4–46.3)
EOSINOPHIL # BLD AUTO: 0.06 10*3/UL (ref 0–0.7)
EOSINOPHIL # BLD AUTO: 1.9 % (ref 0–7)
ERYTHROCYTE [DISTWIDTH] IN BLOOD BY AUTOMATED COUNT: 18.8 % (ref 11.7–14.4)
GFR SERPLBLD BASED ON 1.73 SQ M-ARVRAT: 27 ML/MIN/{1.73_M2}
GLOBULIN UR ELPH-MCNC: 2.1 G/DL (ref 2–3.5)
GLUCOSE SERPL-MCNC: 93 MG/DL (ref 65–99)
HCT VFR BLD AUTO: 20.8 % (ref 37–47)
HGB BLD-MCNC: 6.6 G/DL (ref 12–16)
LYMPHOCYTES # BLD AUTO: 0.75 10*3/UL (ref 1–5)
LYMPHOCYTES NFR BLD AUTO: 23.4 % (ref 20–45)
MACROCYTES BLD QL SMEAR: SLIGHT
MCH RBC QN AUTO: 31.9 PG (ref 27–31)
MCHC RBC AUTO-ENTMCNC: 31.7 G/DL (ref 33–37)
MCV RBC AUTO: 100.5 FL (ref 81–99)
MONOCYTES # BLD AUTO: 0.49 10*3/UL (ref 0.2–1.2)
MONOCYTES NFR BLD AUTO: 15.3 % (ref 3–10)
NEUTROPHILS # BLD AUTO: 1.88 10*3/UL (ref 2–8)
NEUTROPHILS NFR BLD AUTO: 58.5 % (ref 30–85)
NRBC CBCN: 0 % (ref 0–0.7)
OSMOLALITY SERPL CALC.SUM OF ELEC: 287 MOSM/KG (ref 273–304)
OVALOCYTES BLD QL SMEAR: SLIGHT
PLATELET # BLD AUTO: 116 10*3/UL (ref 130–400)
PMV BLD AUTO: 10.7 FL (ref 9.4–12.3)
POIKILOCYTOSIS BLD QL SMEAR: SLIGHT
POTASSIUM SERPL-SCNC: 4.3 MMOL/L (ref 3.5–5.3)
RBC # BLD AUTO: 2.07 10*6/UL (ref 4.2–5.4)
SODIUM SERPL-SCNC: 137 MMOL/L (ref 135–147)
WBC # BLD AUTO: 3.21 10*3/UL (ref 4.8–10.8)

## 2019-09-19 ENCOUNTER — HOSPITAL ENCOUNTER (OUTPATIENT)
Dept: INFUSION THERAPY | Facility: HOSPITAL | Age: 69
Discharge: HOME | End: 2019-09-19
Attending: NURSE PRACTITIONER

## 2019-09-19 LAB
ABO GROUP BLD: NORMAL
BLD GP AB SCN SERPL QL: NORMAL
BLOOD GROUP ANTIBODIES SERPL: NORMAL
CONV ABD CONTROL: NORMAL
RH BLD: NORMAL

## 2019-10-16 ENCOUNTER — HOSPITAL ENCOUNTER (OUTPATIENT)
Dept: ONCOLOGY | Facility: HOSPITAL | Age: 69
Discharge: HOME | End: 2019-10-16
Attending: INTERNAL MEDICINE

## 2019-10-18 ENCOUNTER — HOSPITAL ENCOUNTER (OUTPATIENT)
Dept: LAB | Facility: HOSPITAL | Age: 69
Discharge: HOME | End: 2019-10-18
Attending: NURSE PRACTITIONER

## 2019-10-18 LAB
ALBUMIN SERPL-MCNC: 3.1 G/DL (ref 3.5–5)
ALBUMIN/GLOB SERPL: 1 {RATIO} (ref 1.4–2.6)
ALP SERPL-CCNC: 68 U/L (ref 43–160)
ALT SERPL-CCNC: 8 U/L (ref 10–40)
ANION GAP SERPL CALC-SCNC: 20 MMOL/L (ref 8–19)
AST SERPL-CCNC: 16 U/L (ref 15–50)
BASOPHILS # BLD AUTO: 0.04 10*3/UL (ref 0–0.2)
BASOPHILS NFR BLD AUTO: 0.3 % (ref 0–3)
BILIRUB SERPL-MCNC: 0.33 MG/DL (ref 0.2–1.3)
BUN SERPL-MCNC: 33 MG/DL (ref 5–25)
BUN/CREAT SERPL: 15 {RATIO} (ref 6–20)
CALCIUM SERPL-MCNC: 9.9 MG/DL (ref 8.7–10.4)
CHLORIDE SERPL-SCNC: 103 MMOL/L (ref 99–111)
CONV ABS IMM GRAN: 0.15 10*3/UL (ref 0–0.2)
CONV CO2: 17 MMOL/L (ref 22–32)
CONV IMMATURE GRAN: 1.2 % (ref 0–1.8)
CONV TOTAL PROTEIN: 6.3 G/DL (ref 6.3–8.2)
CREAT UR-MCNC: 2.14 MG/DL (ref 0.5–0.9)
DEPRECATED RDW RBC AUTO: 59.4 FL (ref 36.4–46.3)
EOSINOPHIL # BLD AUTO: 0.99 10*3/UL (ref 0–0.7)
EOSINOPHIL # BLD AUTO: 7.8 % (ref 0–7)
ERYTHROCYTE [DISTWIDTH] IN BLOOD BY AUTOMATED COUNT: 15.4 % (ref 11.7–14.4)
GFR SERPLBLD BASED ON 1.73 SQ M-ARVRAT: 23 ML/MIN/{1.73_M2}
GLOBULIN UR ELPH-MCNC: 3.2 G/DL (ref 2–3.5)
GLUCOSE SERPL-MCNC: 128 MG/DL (ref 65–99)
HCT VFR BLD AUTO: 31.8 % (ref 37–47)
HGB BLD-MCNC: 9.3 G/DL (ref 12–16)
LYMPHOCYTES # BLD AUTO: 1.15 10*3/UL (ref 1–5)
LYMPHOCYTES NFR BLD AUTO: 9.1 % (ref 20–45)
MCH RBC QN AUTO: 30.8 PG (ref 27–31)
MCHC RBC AUTO-ENTMCNC: 29.2 G/DL (ref 33–37)
MCV RBC AUTO: 105.3 FL (ref 81–99)
MONOCYTES # BLD AUTO: 0.5 10*3/UL (ref 0.2–1.2)
MONOCYTES NFR BLD AUTO: 3.9 % (ref 3–10)
NEUTROPHILS # BLD AUTO: 9.86 10*3/UL (ref 2–8)
NEUTROPHILS NFR BLD AUTO: 77.7 % (ref 30–85)
NRBC CBCN: 0 % (ref 0–0.7)
OSMOLALITY SERPL CALC.SUM OF ELEC: 291 MOSM/KG (ref 273–304)
PLATELET # BLD AUTO: 317 10*3/UL (ref 130–400)
PMV BLD AUTO: 11.9 FL (ref 9.4–12.3)
POTASSIUM SERPL-SCNC: 3.7 MMOL/L (ref 3.5–5.3)
RBC # BLD AUTO: 3.02 10*6/UL (ref 4.2–5.4)
SODIUM SERPL-SCNC: 136 MMOL/L (ref 135–147)
WBC # BLD AUTO: 12.69 10*3/UL (ref 4.8–10.8)

## 2019-11-13 ENCOUNTER — HOSPITAL ENCOUNTER (OUTPATIENT)
Dept: GENERAL RADIOLOGY | Facility: HOSPITAL | Age: 69
Discharge: HOME | End: 2019-11-13
Attending: UROLOGY

## 2019-12-04 ENCOUNTER — HOSPITAL ENCOUNTER (OUTPATIENT)
Dept: LAB | Facility: HOSPITAL | Age: 69
Discharge: HOME | End: 2019-12-04

## 2019-12-04 LAB
ALBUMIN SERPL-MCNC: 3.7 G/DL (ref 3.5–5)
ALBUMIN/GLOB SERPL: 1.4 {RATIO} (ref 1.4–2.6)
ALP SERPL-CCNC: 53 U/L (ref 43–160)
ALT SERPL-CCNC: 11 U/L (ref 10–40)
ANION GAP SERPL CALC-SCNC: 16 MMOL/L (ref 8–19)
AST SERPL-CCNC: 18 U/L (ref 15–50)
BASOPHILS # BLD AUTO: 0.03 10*3/UL (ref 0–0.2)
BASOPHILS NFR BLD AUTO: 0.5 % (ref 0–3)
BILIRUB SERPL-MCNC: 0.16 MG/DL (ref 0.2–1.3)
BUN SERPL-MCNC: 20 MG/DL (ref 5–25)
BUN/CREAT SERPL: 17 {RATIO} (ref 6–20)
CALCIUM SERPL-MCNC: 9.5 MG/DL (ref 8.7–10.4)
CHLORIDE SERPL-SCNC: 109 MMOL/L (ref 99–111)
CONV ABS IMM GRAN: 0.03 10*3/UL (ref 0–0.2)
CONV CO2: 21 MMOL/L (ref 22–32)
CONV IMMATURE GRAN: 0.5 % (ref 0–1.8)
CONV TOTAL PROTEIN: 6.4 G/DL (ref 6.3–8.2)
CREAT UR-MCNC: 1.17 MG/DL (ref 0.5–0.9)
DEPRECATED RDW RBC AUTO: 54.5 FL (ref 36.4–46.3)
EOSINOPHIL # BLD AUTO: 0.1 10*3/UL (ref 0–0.7)
EOSINOPHIL # BLD AUTO: 1.6 % (ref 0–7)
ERYTHROCYTE [DISTWIDTH] IN BLOOD BY AUTOMATED COUNT: 14.5 % (ref 11.7–14.4)
ERYTHROCYTE [SEDIMENTATION RATE] IN BLOOD: 18 MM/H (ref 0–30)
FOLATE SERPL-MCNC: 7 NG/ML (ref 4.8–20)
GFR SERPLBLD BASED ON 1.73 SQ M-ARVRAT: 47 ML/MIN/{1.73_M2}
GLOBULIN UR ELPH-MCNC: 2.7 G/DL (ref 2–3.5)
GLUCOSE SERPL-MCNC: 96 MG/DL (ref 65–99)
HCT VFR BLD AUTO: 30.2 % (ref 37–47)
HGB BLD-MCNC: 9.4 G/DL (ref 12–16)
LYMPHOCYTES # BLD AUTO: 1.15 10*3/UL (ref 1–5)
LYMPHOCYTES NFR BLD AUTO: 18.3 % (ref 20–45)
MCH RBC QN AUTO: 31.6 PG (ref 27–31)
MCHC RBC AUTO-ENTMCNC: 31.1 G/DL (ref 33–37)
MCV RBC AUTO: 101.7 FL (ref 81–99)
MONOCYTES # BLD AUTO: 0.44 10*3/UL (ref 0.2–1.2)
MONOCYTES NFR BLD AUTO: 7 % (ref 3–10)
NEUTROPHILS # BLD AUTO: 4.52 10*3/UL (ref 2–8)
NEUTROPHILS NFR BLD AUTO: 72.1 % (ref 30–85)
NRBC CBCN: 0 % (ref 0–0.7)
OSMOLALITY SERPL CALC.SUM OF ELEC: 296 MOSM/KG (ref 273–304)
PLATELET # BLD AUTO: 206 10*3/UL (ref 130–400)
PMV BLD AUTO: 10.8 FL (ref 9.4–12.3)
POTASSIUM SERPL-SCNC: 4.1 MMOL/L (ref 3.5–5.3)
RBC # BLD AUTO: 2.97 10*6/UL (ref 4.2–5.4)
SODIUM SERPL-SCNC: 142 MMOL/L (ref 135–147)
T4 FREE SERPL-MCNC: 1.2 NG/DL (ref 0.9–1.8)
TSH SERPL-ACNC: 1.8 M[IU]/L (ref 0.27–4.2)
VIT B12 SERPL-MCNC: 275 PG/ML (ref 211–911)
WBC # BLD AUTO: 6.27 10*3/UL (ref 4.8–10.8)

## 2019-12-07 LAB — CONV TREPONEMA PALLIDUM (RPR) WITH FTA-ABS, TP-PA REFLEXES: NON REACTIVE

## 2019-12-12 ENCOUNTER — HOSPITAL ENCOUNTER (OUTPATIENT)
Dept: GENERAL RADIOLOGY | Facility: HOSPITAL | Age: 69
Discharge: HOME | End: 2019-12-12

## 2019-12-17 ENCOUNTER — CONVERSION ENCOUNTER (OUTPATIENT)
Dept: FAMILY MEDICINE CLINIC | Facility: CLINIC | Age: 69
End: 2019-12-17

## 2019-12-17 ENCOUNTER — OFFICE VISIT CONVERTED (OUTPATIENT)
Dept: FAMILY MEDICINE CLINIC | Facility: CLINIC | Age: 69
End: 2019-12-17
Attending: NURSE PRACTITIONER

## 2019-12-30 ENCOUNTER — OFFICE VISIT CONVERTED (OUTPATIENT)
Dept: GASTROENTEROLOGY | Facility: CLINIC | Age: 69
End: 2019-12-30
Attending: INTERNAL MEDICINE

## 2020-01-02 ENCOUNTER — HOSPITAL ENCOUNTER (OUTPATIENT)
Dept: LAB | Facility: HOSPITAL | Age: 70
Discharge: HOME | End: 2020-01-02
Attending: INTERNAL MEDICINE

## 2020-01-02 LAB
C DIFF TOX B STL QL CT TISS CULT: NEGATIVE
CONV 027 TOXIN: NEGATIVE

## 2020-01-04 LAB — BACTERIA SPEC AEROBE CULT: NORMAL

## 2020-01-16 ENCOUNTER — HOSPITAL ENCOUNTER (OUTPATIENT)
Dept: GENERAL RADIOLOGY | Facility: HOSPITAL | Age: 70
Discharge: HOME | End: 2020-01-16
Attending: NURSE PRACTITIONER

## 2020-01-16 LAB
CREAT BLD-MCNC: 1.2 MG/DL (ref 0.6–1.4)
GFR SERPLBLD BASED ON 1.73 SQ M-ARVRAT: 45 ML/MIN/{1.73_M2}

## 2020-01-22 ENCOUNTER — HOSPITAL ENCOUNTER (OUTPATIENT)
Dept: ONCOLOGY | Facility: HOSPITAL | Age: 70
Discharge: HOME | End: 2020-01-22
Attending: INTERNAL MEDICINE

## 2020-01-22 ENCOUNTER — OFFICE VISIT CONVERTED (OUTPATIENT)
Dept: ONCOLOGY | Facility: HOSPITAL | Age: 70
End: 2020-01-22
Attending: INTERNAL MEDICINE

## 2020-03-02 ENCOUNTER — CONVERSION ENCOUNTER (OUTPATIENT)
Dept: GASTROENTEROLOGY | Facility: CLINIC | Age: 70
End: 2020-03-02

## 2020-03-02 ENCOUNTER — OFFICE VISIT CONVERTED (OUTPATIENT)
Dept: GASTROENTEROLOGY | Facility: CLINIC | Age: 70
End: 2020-03-02
Attending: INTERNAL MEDICINE

## 2020-03-19 ENCOUNTER — HOSPITAL ENCOUNTER (OUTPATIENT)
Dept: RESPIRATORY THERAPY | Facility: HOSPITAL | Age: 70
Discharge: HOME | End: 2020-03-19

## 2020-04-15 ENCOUNTER — HOSPITAL ENCOUNTER (OUTPATIENT)
Dept: LAB | Facility: HOSPITAL | Age: 70
Discharge: HOME | End: 2020-04-15
Attending: INTERNAL MEDICINE

## 2020-04-15 LAB
ALBUMIN SERPL-MCNC: 4 G/DL (ref 3.5–5)
ALBUMIN/GLOB SERPL: 1.8 {RATIO} (ref 1.4–2.6)
ALP SERPL-CCNC: 44 U/L (ref 43–160)
ALT SERPL-CCNC: 11 U/L (ref 10–40)
ANION GAP SERPL CALC-SCNC: 19 MMOL/L (ref 8–19)
AST SERPL-CCNC: 18 U/L (ref 15–50)
BASOPHILS # BLD AUTO: 0.04 10*3/UL (ref 0–0.2)
BASOPHILS NFR BLD AUTO: 0.7 % (ref 0–3)
BILIRUB SERPL-MCNC: 0.32 MG/DL (ref 0.2–1.3)
BUN SERPL-MCNC: 18 MG/DL (ref 5–25)
BUN/CREAT SERPL: 14 {RATIO} (ref 6–20)
CALCIUM SERPL-MCNC: 9.3 MG/DL (ref 8.7–10.4)
CHLORIDE SERPL-SCNC: 110 MMOL/L (ref 99–111)
CONV ABS IMM GRAN: 0.03 10*3/UL (ref 0–0.2)
CONV CO2: 20 MMOL/L (ref 22–32)
CONV IMMATURE GRAN: 0.5 % (ref 0–1.8)
CONV TOTAL PROTEIN: 6.2 G/DL (ref 6.3–8.2)
CREAT UR-MCNC: 1.32 MG/DL (ref 0.5–0.9)
DEPRECATED RDW RBC AUTO: 49.2 FL (ref 36.4–46.3)
EOSINOPHIL # BLD AUTO: 0.09 10*3/UL (ref 0–0.7)
EOSINOPHIL # BLD AUTO: 1.6 % (ref 0–7)
ERYTHROCYTE [DISTWIDTH] IN BLOOD BY AUTOMATED COUNT: 13.3 % (ref 11.7–14.4)
GFR SERPLBLD BASED ON 1.73 SQ M-ARVRAT: 40 ML/MIN/{1.73_M2}
GLOBULIN UR ELPH-MCNC: 2.2 G/DL (ref 2–3.5)
GLUCOSE SERPL-MCNC: 69 MG/DL (ref 65–99)
HCT VFR BLD AUTO: 32.6 % (ref 37–47)
HGB BLD-MCNC: 10.3 G/DL (ref 12–16)
LYMPHOCYTES # BLD AUTO: 1.52 10*3/UL (ref 1–5)
LYMPHOCYTES NFR BLD AUTO: 26.2 % (ref 20–45)
MCH RBC QN AUTO: 31.8 PG (ref 27–31)
MCHC RBC AUTO-ENTMCNC: 31.6 G/DL (ref 33–37)
MCV RBC AUTO: 100.6 FL (ref 81–99)
MONOCYTES # BLD AUTO: 0.52 10*3/UL (ref 0.2–1.2)
MONOCYTES NFR BLD AUTO: 9 % (ref 3–10)
NEUTROPHILS # BLD AUTO: 3.6 10*3/UL (ref 2–8)
NEUTROPHILS NFR BLD AUTO: 62 % (ref 30–85)
NRBC CBCN: 0 % (ref 0–0.7)
OSMOLALITY SERPL CALC.SUM OF ELEC: 300 MOSM/KG (ref 273–304)
PLATELET # BLD AUTO: 166 10*3/UL (ref 130–400)
PMV BLD AUTO: 10.6 FL (ref 9.4–12.3)
POTASSIUM SERPL-SCNC: 4 MMOL/L (ref 3.5–5.3)
RBC # BLD AUTO: 3.24 10*6/UL (ref 4.2–5.4)
SODIUM SERPL-SCNC: 145 MMOL/L (ref 135–147)
WBC # BLD AUTO: 5.8 10*3/UL (ref 4.8–10.8)

## 2020-04-21 ENCOUNTER — OFFICE VISIT CONVERTED (OUTPATIENT)
Dept: ONCOLOGY | Facility: HOSPITAL | Age: 70
End: 2020-04-21
Attending: INTERNAL MEDICINE

## 2020-07-14 ENCOUNTER — OFFICE VISIT CONVERTED (OUTPATIENT)
Dept: FAMILY MEDICINE CLINIC | Facility: CLINIC | Age: 70
End: 2020-07-14
Attending: NURSE PRACTITIONER

## 2020-07-15 ENCOUNTER — HOSPITAL ENCOUNTER (OUTPATIENT)
Dept: LAB | Facility: HOSPITAL | Age: 70
Discharge: HOME | End: 2020-07-15
Attending: NURSE PRACTITIONER

## 2020-07-15 LAB
ALBUMIN SERPL-MCNC: 4.2 G/DL (ref 3.5–5)
ALBUMIN/GLOB SERPL: 1.8 {RATIO} (ref 1.4–2.6)
ALP SERPL-CCNC: 53 U/L (ref 43–160)
ALT SERPL-CCNC: 13 U/L (ref 10–40)
ANION GAP SERPL CALC-SCNC: 19 MMOL/L (ref 8–19)
AST SERPL-CCNC: 18 U/L (ref 15–50)
BASOPHILS # BLD AUTO: 0.03 10*3/UL (ref 0–0.2)
BASOPHILS NFR BLD AUTO: 0.6 % (ref 0–3)
BILIRUB SERPL-MCNC: 0.31 MG/DL (ref 0.2–1.3)
BUN SERPL-MCNC: 19 MG/DL (ref 5–25)
BUN/CREAT SERPL: 13 {RATIO} (ref 6–20)
CALCIUM SERPL-MCNC: 9.7 MG/DL (ref 8.7–10.4)
CHLORIDE SERPL-SCNC: 110 MMOL/L (ref 99–111)
CHOLEST SERPL-MCNC: 161 MG/DL (ref 107–200)
CHOLEST/HDLC SERPL: 2.3 {RATIO} (ref 3–6)
CONV ABS IMM GRAN: 0.02 10*3/UL (ref 0–0.2)
CONV CO2: 22 MMOL/L (ref 22–32)
CONV IMMATURE GRAN: 0.4 % (ref 0–1.8)
CONV TOTAL PROTEIN: 6.6 G/DL (ref 6.3–8.2)
CREAT UR-MCNC: 1.52 MG/DL (ref 0.5–0.9)
DEPRECATED RDW RBC AUTO: 49.4 FL (ref 36.4–46.3)
EOSINOPHIL # BLD AUTO: 0.08 10*3/UL (ref 0–0.7)
EOSINOPHIL # BLD AUTO: 1.5 % (ref 0–7)
ERYTHROCYTE [DISTWIDTH] IN BLOOD BY AUTOMATED COUNT: 12.9 % (ref 11.7–14.4)
FOLATE SERPL-MCNC: 6.6 NG/ML (ref 4.8–20)
GFR SERPLBLD BASED ON 1.73 SQ M-ARVRAT: 34 ML/MIN/{1.73_M2}
GLOBULIN UR ELPH-MCNC: 2.4 G/DL (ref 2–3.5)
GLUCOSE SERPL-MCNC: 89 MG/DL (ref 65–99)
HCT VFR BLD AUTO: 35 % (ref 37–47)
HDLC SERPL-MCNC: 69 MG/DL (ref 40–60)
HGB BLD-MCNC: 10.7 G/DL (ref 12–16)
LDLC SERPL CALC-MCNC: 70 MG/DL (ref 70–100)
LYMPHOCYTES # BLD AUTO: 1.33 10*3/UL (ref 1–5)
LYMPHOCYTES NFR BLD AUTO: 25.3 % (ref 20–45)
MCH RBC QN AUTO: 32 PG (ref 27–31)
MCHC RBC AUTO-ENTMCNC: 30.6 G/DL (ref 33–37)
MCV RBC AUTO: 104.8 FL (ref 81–99)
MONOCYTES # BLD AUTO: 0.35 10*3/UL (ref 0.2–1.2)
MONOCYTES NFR BLD AUTO: 6.7 % (ref 3–10)
NEUTROPHILS # BLD AUTO: 3.45 10*3/UL (ref 2–8)
NEUTROPHILS NFR BLD AUTO: 65.5 % (ref 30–85)
NRBC CBCN: 0 % (ref 0–0.7)
OSMOLALITY SERPL CALC.SUM OF ELEC: 304 MOSM/KG (ref 273–304)
PLATELET # BLD AUTO: 190 10*3/UL (ref 130–400)
PMV BLD AUTO: 9.8 FL (ref 9.4–12.3)
POTASSIUM SERPL-SCNC: 4.6 MMOL/L (ref 3.5–5.3)
RBC # BLD AUTO: 3.34 10*6/UL (ref 4.2–5.4)
SODIUM SERPL-SCNC: 146 MMOL/L (ref 135–147)
TRIGL SERPL-MCNC: 111 MG/DL (ref 40–150)
TSH SERPL-ACNC: 2.17 M[IU]/L (ref 0.27–4.2)
VIT B12 SERPL-MCNC: 554 PG/ML (ref 211–911)
VLDLC SERPL-MCNC: 22 MG/DL (ref 5–37)
WBC # BLD AUTO: 5.26 10*3/UL (ref 4.8–10.8)

## 2020-07-21 ENCOUNTER — HOSPITAL ENCOUNTER (OUTPATIENT)
Dept: LAB | Facility: HOSPITAL | Age: 70
Discharge: HOME | End: 2020-07-21
Attending: NURSE PRACTITIONER

## 2020-07-21 LAB
APPEARANCE UR: ABNORMAL
BILIRUB UR QL: NEGATIVE
COLOR UR: YELLOW
CONV BACTERIA: ABNORMAL
CONV COLLECTION SOURCE (UA): ABNORMAL
CONV UROBILINOGEN IN URINE BY AUTOMATED TEST STRIP: 0.2 {EHRLICHU}/DL (ref 0.1–1)
GLUCOSE UR QL: NEGATIVE MG/DL
HGB UR QL STRIP: ABNORMAL
KETONES UR QL STRIP: NEGATIVE MG/DL
LEUKOCYTE ESTERASE UR QL STRIP: ABNORMAL
NITRITE UR QL STRIP: NEGATIVE
PH UR STRIP.AUTO: 7 [PH] (ref 5–8)
PROT UR QL: 30 MG/DL
RBC #/AREA URNS HPF: ABNORMAL /[HPF]
SP GR UR: 1.01 (ref 1–1.03)
SQUAMOUS SPT QL MICRO: ABNORMAL /[HPF]
WBC #/AREA URNS HPF: ABNORMAL /[HPF]

## 2020-07-23 LAB
AMOXICILLIN+CLAV SUSC ISLT: 4
AMOXICILLIN+CLAV SUSC ISLT: <=2
AMPICILLIN SUSC ISLT: 8
AMPICILLIN SUSC ISLT: >=32
AMPICILLIN+SULBAC SUSC ISLT: 8
AMPICILLIN+SULBAC SUSC ISLT: <=2
BACTERIA UR CULT: ABNORMAL
CEFAZOLIN SUSC ISLT: <=4
CEFAZOLIN SUSC ISLT: <=4
CEFEPIME SUSC ISLT: <=1
CEFEPIME SUSC ISLT: <=1
CEFTAZIDIME SUSC ISLT: <=1
CEFTAZIDIME SUSC ISLT: <=1
CEFTRIAXONE SUSC ISLT: <=1
CEFTRIAXONE SUSC ISLT: <=1
CEFUROXIME ORAL SUSC ISLT: 2
CEFUROXIME ORAL SUSC ISLT: 4
CEFUROXIME PARENTER SUSC ISLT: 2
CEFUROXIME PARENTER SUSC ISLT: 4
CIPROFLOXACIN SUSC ISLT: <=0.25
CIPROFLOXACIN SUSC ISLT: <=0.25
ERTAPENEM SUSC ISLT: <=0.5
ERTAPENEM SUSC ISLT: <=0.5
GENTAMICIN SUSC ISLT: <=1
GENTAMICIN SUSC ISLT: <=1
LEVOFLOXACIN SUSC ISLT: <=0.12
LEVOFLOXACIN SUSC ISLT: <=0.12
NITROFURANTOIN SUSC ISLT: 64
NITROFURANTOIN SUSC ISLT: <=16
TETRACYCLINE SUSC ISLT: <=1
TETRACYCLINE SUSC ISLT: <=1
TMP SMX SUSC ISLT: <=20
TMP SMX SUSC ISLT: <=20
TOBRAMYCIN SUSC ISLT: <=1
TOBRAMYCIN SUSC ISLT: <=1

## 2020-07-27 ENCOUNTER — HOSPITAL ENCOUNTER (OUTPATIENT)
Dept: CT IMAGING | Facility: HOSPITAL | Age: 70
Discharge: HOME | End: 2020-07-27
Attending: INTERNAL MEDICINE

## 2020-07-27 LAB
ALBUMIN SERPL-MCNC: 3.7 G/DL (ref 3.5–5)
ALBUMIN/GLOB SERPL: 1.4 {RATIO} (ref 1.4–2.6)
ALP SERPL-CCNC: 51 U/L (ref 43–160)
ALT SERPL-CCNC: 11 U/L (ref 10–40)
ANION GAP SERPL CALC-SCNC: 21 MMOL/L (ref 8–19)
AST SERPL-CCNC: 21 U/L (ref 15–50)
BASOPHILS # BLD AUTO: 0.03 10*3/UL (ref 0–0.2)
BASOPHILS NFR BLD AUTO: 0.8 % (ref 0–3)
BILIRUB SERPL-MCNC: 0.26 MG/DL (ref 0.2–1.3)
BUN SERPL-MCNC: 20 MG/DL (ref 5–25)
BUN/CREAT SERPL: 10 {RATIO} (ref 6–20)
CALCIUM SERPL-MCNC: 9.5 MG/DL (ref 8.7–10.4)
CHLORIDE SERPL-SCNC: 100 MMOL/L (ref 99–111)
CONV ABS IMM GRAN: 0.03 10*3/UL (ref 0–0.2)
CONV CO2: 16 MMOL/L (ref 22–32)
CONV IMMATURE GRAN: 0.8 % (ref 0–1.8)
CONV TOTAL PROTEIN: 6.3 G/DL (ref 6.3–8.2)
CREAT UR-MCNC: 1.92 MG/DL (ref 0.5–0.9)
DEPRECATED RDW RBC AUTO: 49.6 FL (ref 36.4–46.3)
EOSINOPHIL # BLD AUTO: 0.05 10*3/UL (ref 0–0.7)
EOSINOPHIL # BLD AUTO: 1.3 % (ref 0–7)
ERYTHROCYTE [DISTWIDTH] IN BLOOD BY AUTOMATED COUNT: 13.1 % (ref 11.7–14.4)
GFR SERPLBLD BASED ON 1.73 SQ M-ARVRAT: 26 ML/MIN/{1.73_M2}
GLOBULIN UR ELPH-MCNC: 2.6 G/DL (ref 2–3.5)
GLUCOSE SERPL-MCNC: 56 MG/DL (ref 65–99)
HCT VFR BLD AUTO: 31.7 % (ref 37–47)
HGB BLD-MCNC: 9.8 G/DL (ref 12–16)
LYMPHOCYTES # BLD AUTO: 0.95 10*3/UL (ref 1–5)
LYMPHOCYTES NFR BLD AUTO: 24.1 % (ref 20–45)
MCH RBC QN AUTO: 32 PG (ref 27–31)
MCHC RBC AUTO-ENTMCNC: 30.9 G/DL (ref 33–37)
MCV RBC AUTO: 103.6 FL (ref 81–99)
MONOCYTES # BLD AUTO: 0.53 10*3/UL (ref 0.2–1.2)
MONOCYTES NFR BLD AUTO: 13.4 % (ref 3–10)
NEUTROPHILS # BLD AUTO: 2.36 10*3/UL (ref 2–8)
NEUTROPHILS NFR BLD AUTO: 59.6 % (ref 30–85)
NRBC CBCN: 0 % (ref 0–0.7)
OSMOLALITY SERPL CALC.SUM OF ELEC: 274 MOSM/KG (ref 273–304)
PLATELET # BLD AUTO: 147 10*3/UL (ref 130–400)
PMV BLD AUTO: 9.4 FL (ref 9.4–12.3)
POTASSIUM SERPL-SCNC: 4.8 MMOL/L (ref 3.5–5.3)
RBC # BLD AUTO: 3.06 10*6/UL (ref 4.2–5.4)
SODIUM SERPL-SCNC: 132 MMOL/L (ref 135–147)
WBC # BLD AUTO: 3.95 10*3/UL (ref 4.8–10.8)

## 2020-07-28 ENCOUNTER — OFFICE VISIT CONVERTED (OUTPATIENT)
Dept: ONCOLOGY | Facility: HOSPITAL | Age: 70
End: 2020-07-28
Attending: NURSE PRACTITIONER

## 2020-07-28 ENCOUNTER — HOSPITAL ENCOUNTER (OUTPATIENT)
Dept: ONCOLOGY | Facility: HOSPITAL | Age: 70
Discharge: HOME | End: 2020-07-28
Attending: NURSE PRACTITIONER

## 2020-08-03 ENCOUNTER — OFFICE VISIT CONVERTED (OUTPATIENT)
Dept: GASTROENTEROLOGY | Facility: CLINIC | Age: 70
End: 2020-08-03
Attending: INTERNAL MEDICINE

## 2020-08-03 ENCOUNTER — CONVERSION ENCOUNTER (OUTPATIENT)
Dept: GASTROENTEROLOGY | Facility: CLINIC | Age: 70
End: 2020-08-03

## 2020-08-14 ENCOUNTER — TELEPHONE CONVERTED (OUTPATIENT)
Dept: FAMILY MEDICINE CLINIC | Facility: CLINIC | Age: 70
End: 2020-08-14
Attending: NURSE PRACTITIONER

## 2020-08-28 ENCOUNTER — HOSPITAL ENCOUNTER (OUTPATIENT)
Dept: URGENT CARE | Facility: CLINIC | Age: 70
Discharge: HOME | End: 2020-08-28

## 2020-10-21 ENCOUNTER — HOSPITAL ENCOUNTER (OUTPATIENT)
Dept: LAB | Facility: HOSPITAL | Age: 70
Discharge: HOME | End: 2020-10-21
Attending: NURSE PRACTITIONER

## 2020-10-21 ENCOUNTER — HOSPITAL ENCOUNTER (OUTPATIENT)
Dept: ONCOLOGY | Facility: HOSPITAL | Age: 70
Discharge: HOME | End: 2020-10-21
Attending: NURSE PRACTITIONER

## 2020-10-21 LAB
ALBUMIN SERPL-MCNC: 4.1 G/DL (ref 3.5–5)
ALBUMIN/GLOB SERPL: 1.8 {RATIO} (ref 1.4–2.6)
ALP SERPL-CCNC: 49 U/L (ref 43–160)
ALT SERPL-CCNC: 10 U/L (ref 10–40)
ANION GAP SERPL CALC-SCNC: 14 MMOL/L (ref 8–19)
AST SERPL-CCNC: 22 U/L (ref 15–50)
BASOPHILS # BLD AUTO: 0.04 10*3/UL (ref 0–0.2)
BASOPHILS NFR BLD AUTO: 0.6 % (ref 0–3)
BILIRUB SERPL-MCNC: 0.26 MG/DL (ref 0.2–1.3)
BUN SERPL-MCNC: 18 MG/DL (ref 5–25)
BUN/CREAT SERPL: 14 {RATIO} (ref 6–20)
CALCIUM SERPL-MCNC: 9.1 MG/DL (ref 8.7–10.4)
CHLORIDE SERPL-SCNC: 106 MMOL/L (ref 99–111)
CONV ABS IMM GRAN: 0.04 10*3/UL (ref 0–0.2)
CONV CO2: 25 MMOL/L (ref 22–32)
CONV IMMATURE GRAN: 0.6 % (ref 0–1.8)
CONV TOTAL PROTEIN: 6.4 G/DL (ref 6.3–8.2)
CREAT UR-MCNC: 1.27 MG/DL (ref 0.5–0.9)
DEPRECATED RDW RBC AUTO: 49.7 FL (ref 36.4–46.3)
EOSINOPHIL # BLD AUTO: 0.06 10*3/UL (ref 0–0.7)
EOSINOPHIL # BLD AUTO: 0.9 % (ref 0–7)
ERYTHROCYTE [DISTWIDTH] IN BLOOD BY AUTOMATED COUNT: 13.2 % (ref 11.7–14.4)
GFR SERPLBLD BASED ON 1.73 SQ M-ARVRAT: 42 ML/MIN/{1.73_M2}
GLOBULIN UR ELPH-MCNC: 2.3 G/DL (ref 2–3.5)
GLUCOSE SERPL-MCNC: 73 MG/DL (ref 65–99)
HCT VFR BLD AUTO: 35.4 % (ref 37–47)
HGB BLD-MCNC: 10.9 G/DL (ref 12–16)
LDH SERPL-CCNC: 236 U/L (ref 120–240)
LYMPHOCYTES # BLD AUTO: 1.61 10*3/UL (ref 1–5)
LYMPHOCYTES NFR BLD AUTO: 24.2 % (ref 20–45)
MCH RBC QN AUTO: 31.6 PG (ref 27–31)
MCHC RBC AUTO-ENTMCNC: 30.8 G/DL (ref 33–37)
MCV RBC AUTO: 102.6 FL (ref 81–99)
MONOCYTES # BLD AUTO: 0.58 10*3/UL (ref 0.2–1.2)
MONOCYTES NFR BLD AUTO: 8.7 % (ref 3–10)
NEUTROPHILS # BLD AUTO: 4.32 10*3/UL (ref 2–8)
NEUTROPHILS NFR BLD AUTO: 65 % (ref 30–85)
NRBC CBCN: 0 % (ref 0–0.7)
OSMOLALITY SERPL CALC.SUM OF ELEC: 292 MOSM/KG (ref 273–304)
PLATELET # BLD AUTO: 165 10*3/UL (ref 130–400)
PMV BLD AUTO: 10.5 FL (ref 9.4–12.3)
POTASSIUM SERPL-SCNC: 3.9 MMOL/L (ref 3.5–5.3)
RBC # BLD AUTO: 3.45 10*6/UL (ref 4.2–5.4)
SODIUM SERPL-SCNC: 141 MMOL/L (ref 135–147)
WBC # BLD AUTO: 6.65 10*3/UL (ref 4.8–10.8)

## 2020-10-28 ENCOUNTER — OFFICE VISIT CONVERTED (OUTPATIENT)
Dept: ONCOLOGY | Facility: HOSPITAL | Age: 70
End: 2020-10-28
Attending: INTERNAL MEDICINE

## 2021-01-08 ENCOUNTER — HOSPITAL ENCOUNTER (OUTPATIENT)
Dept: OTHER | Facility: HOSPITAL | Age: 71
Discharge: HOME | End: 2021-01-08
Attending: NURSE PRACTITIONER

## 2021-01-08 LAB
25(OH)D3 SERPL-MCNC: 22.5 NG/ML (ref 30–100)
ALBUMIN SERPL-MCNC: 4.3 G/DL (ref 3.5–5)
ALBUMIN/GLOB SERPL: 1.7 {RATIO} (ref 1.4–2.6)
ALP SERPL-CCNC: 59 U/L (ref 43–160)
ALT SERPL-CCNC: 14 U/L (ref 10–40)
ANION GAP SERPL CALC-SCNC: 16 MMOL/L (ref 8–19)
AST SERPL-CCNC: 23 U/L (ref 15–50)
BASOPHILS # BLD AUTO: 0.06 10*3/UL (ref 0–0.2)
BASOPHILS NFR BLD AUTO: 0.8 % (ref 0–3)
BILIRUB SERPL-MCNC: 0.33 MG/DL (ref 0.2–1.3)
BUN SERPL-MCNC: 26 MG/DL (ref 5–25)
BUN/CREAT SERPL: 15 {RATIO} (ref 6–20)
CALCIUM SERPL-MCNC: 9.5 MG/DL (ref 8.7–10.4)
CHLORIDE SERPL-SCNC: 109 MMOL/L (ref 99–111)
CHOLEST SERPL-MCNC: 163 MG/DL (ref 107–200)
CHOLEST/HDLC SERPL: 2.6 {RATIO} (ref 3–6)
CONV ABS IMM GRAN: 0.03 10*3/UL (ref 0–0.2)
CONV CO2: 22 MMOL/L (ref 22–32)
CONV IMMATURE GRAN: 0.4 % (ref 0–1.8)
CONV TOTAL PROTEIN: 6.9 G/DL (ref 6.3–8.2)
CREAT UR-MCNC: 1.68 MG/DL (ref 0.5–0.9)
DEPRECATED RDW RBC AUTO: 47.9 FL (ref 36.4–46.3)
EOSINOPHIL # BLD AUTO: 0.08 10*3/UL (ref 0–0.7)
EOSINOPHIL # BLD AUTO: 1.1 % (ref 0–7)
ERYTHROCYTE [DISTWIDTH] IN BLOOD BY AUTOMATED COUNT: 12.9 % (ref 11.7–14.4)
FOLATE SERPL-MCNC: 11 NG/ML (ref 4.8–20)
GFR SERPLBLD BASED ON 1.73 SQ M-ARVRAT: 30 ML/MIN/{1.73_M2}
GLOBULIN UR ELPH-MCNC: 2.6 G/DL (ref 2–3.5)
GLUCOSE SERPL-MCNC: 105 MG/DL (ref 65–99)
HCT VFR BLD AUTO: 35.2 % (ref 37–47)
HDLC SERPL-MCNC: 62 MG/DL (ref 40–60)
HGB BLD-MCNC: 11 G/DL (ref 12–16)
LDLC SERPL CALC-MCNC: 74 MG/DL (ref 70–100)
LYMPHOCYTES # BLD AUTO: 1.53 10*3/UL (ref 1–5)
LYMPHOCYTES NFR BLD AUTO: 21.3 % (ref 20–45)
MCH RBC QN AUTO: 31.6 PG (ref 27–31)
MCHC RBC AUTO-ENTMCNC: 31.3 G/DL (ref 33–37)
MCV RBC AUTO: 101.1 FL (ref 81–99)
MONOCYTES # BLD AUTO: 0.46 10*3/UL (ref 0.2–1.2)
MONOCYTES NFR BLD AUTO: 6.4 % (ref 3–10)
NEUTROPHILS # BLD AUTO: 5.01 10*3/UL (ref 2–8)
NEUTROPHILS NFR BLD AUTO: 70 % (ref 30–85)
NRBC CBCN: 0 % (ref 0–0.7)
OSMOLALITY SERPL CALC.SUM OF ELEC: 301 MOSM/KG (ref 273–304)
PLATELET # BLD AUTO: 212 10*3/UL (ref 130–400)
PMV BLD AUTO: 11.5 FL (ref 9.4–12.3)
POTASSIUM SERPL-SCNC: 3.9 MMOL/L (ref 3.5–5.3)
RBC # BLD AUTO: 3.48 10*6/UL (ref 4.2–5.4)
SODIUM SERPL-SCNC: 143 MMOL/L (ref 135–147)
TRIGL SERPL-MCNC: 136 MG/DL (ref 40–150)
TSH SERPL-ACNC: 0.86 M[IU]/L (ref 0.27–4.2)
VIT B12 SERPL-MCNC: 1383 PG/ML (ref 211–911)
VLDLC SERPL-MCNC: 27 MG/DL (ref 5–37)
WBC # BLD AUTO: 7.17 10*3/UL (ref 4.8–10.8)

## 2021-01-16 ENCOUNTER — HOSPITAL ENCOUNTER (OUTPATIENT)
Dept: OTHER | Facility: HOSPITAL | Age: 71
Discharge: HOME | End: 2021-01-16
Attending: NURSE PRACTITIONER

## 2021-01-16 LAB
APPEARANCE UR: CLEAR
BILIRUB UR QL: NEGATIVE
COLOR UR: YELLOW
CONV BACTERIA: ABNORMAL
CONV COLLECTION SOURCE (UA): ABNORMAL
CONV CRYSTALS: ABNORMAL /[HPF]
CONV UROBILINOGEN IN URINE BY AUTOMATED TEST STRIP: 0.2 {EHRLICHU}/DL (ref 0.1–1)
GLUCOSE UR QL: NEGATIVE MG/DL
HGB UR QL STRIP: ABNORMAL
KETONES UR QL STRIP: NEGATIVE MG/DL
LEUKOCYTE ESTERASE UR QL STRIP: ABNORMAL
MUCOUS THREADS URNS QL MICRO: ABNORMAL
NITRITE UR QL STRIP: NEGATIVE
PH UR STRIP.AUTO: 6 [PH] (ref 5–8)
PROT UR QL: ABNORMAL MG/DL
RBC #/AREA URNS HPF: ABNORMAL /[HPF]
SP GR UR: 1.01 (ref 1–1.03)
SQUAMOUS SPT QL MICRO: ABNORMAL /[HPF]
WBC #/AREA URNS HPF: ABNORMAL /[HPF]

## 2021-01-18 LAB
AMPICILLIN SUSC ISLT: 4
AMPICILLIN SUSC ISLT: <=2
AMPICILLIN+SULBAC SUSC ISLT: <=2
AMPICILLIN+SULBAC SUSC ISLT: <=2
BACTERIA UR CULT: ABNORMAL
CEFAZOLIN SUSC ISLT: <=4
CEFAZOLIN SUSC ISLT: <=4
CEFEPIME SUSC ISLT: <=0.12
CEFEPIME SUSC ISLT: <=0.12
CEFTAZIDIME SUSC ISLT: <=1
CEFTAZIDIME SUSC ISLT: <=1
CEFTRIAXONE SUSC ISLT: <=0.25
CEFTRIAXONE SUSC ISLT: <=0.25
CIPROFLOXACIN SUSC ISLT: <=0.25
CIPROFLOXACIN SUSC ISLT: <=0.25
ERTAPENEM SUSC ISLT: <=0.12
ERTAPENEM SUSC ISLT: <=0.12
GENTAMICIN SUSC ISLT: <=1
GENTAMICIN SUSC ISLT: <=1
LEVOFLOXACIN SUSC ISLT: <=0.12
LEVOFLOXACIN SUSC ISLT: <=0.12
NITROFURANTOIN SUSC ISLT: 128
NITROFURANTOIN SUSC ISLT: <=16
PIP+TAZO SUSC ISLT: <=4
PIP+TAZO SUSC ISLT: <=4
TMP SMX SUSC ISLT: <=20
TMP SMX SUSC ISLT: <=20
TOBRAMYCIN SUSC ISLT: <=1
TOBRAMYCIN SUSC ISLT: <=1

## 2021-01-25 ENCOUNTER — HOSPITAL ENCOUNTER (OUTPATIENT)
Dept: GENERAL RADIOLOGY | Facility: HOSPITAL | Age: 71
Discharge: HOME | End: 2021-01-25
Attending: INTERNAL MEDICINE

## 2021-01-25 LAB
CREAT BLD-MCNC: 1.9 MG/DL (ref 0.6–1.4)
GFR SERPLBLD BASED ON 1.73 SQ M-ARVRAT: 26 ML/MIN/{1.73_M2}

## 2021-01-27 ENCOUNTER — OFFICE VISIT CONVERTED (OUTPATIENT)
Dept: ONCOLOGY | Facility: HOSPITAL | Age: 71
End: 2021-01-27
Attending: INTERNAL MEDICINE

## 2021-02-03 ENCOUNTER — HOSPITAL ENCOUNTER (OUTPATIENT)
Dept: OTHER | Facility: HOSPITAL | Age: 71
Discharge: HOME | End: 2021-02-03
Attending: NURSE PRACTITIONER

## 2021-02-03 LAB
ALBUMIN SERPL-MCNC: 3.2 G/DL (ref 3.5–5)
ALBUMIN/GLOB SERPL: 1.3 {RATIO} (ref 1.4–2.6)
ALP SERPL-CCNC: 59 U/L (ref 43–160)
ALT SERPL-CCNC: 20 U/L (ref 10–40)
ANION GAP SERPL CALC-SCNC: 14 MMOL/L (ref 8–19)
AST SERPL-CCNC: 22 U/L (ref 15–50)
BASOPHILS # BLD AUTO: 0.01 10*3/UL (ref 0–0.2)
BASOPHILS NFR BLD AUTO: 0.2 % (ref 0–3)
BILIRUB SERPL-MCNC: <0.15 MG/DL (ref 0.2–1.3)
BUN SERPL-MCNC: 49 MG/DL (ref 5–25)
BUN/CREAT SERPL: 19 {RATIO} (ref 6–20)
CALCIUM SERPL-MCNC: 9.2 MG/DL (ref 8.7–10.4)
CHLORIDE SERPL-SCNC: 101 MMOL/L (ref 99–111)
CONV ABS IMM GRAN: 0.07 10*3/UL (ref 0–0.2)
CONV CO2: 19 MMOL/L (ref 22–32)
CONV IMMATURE GRAN: 1.2 % (ref 0–1.8)
CONV TOTAL PROTEIN: 5.7 G/DL (ref 6.3–8.2)
CREAT UR-MCNC: 2.59 MG/DL (ref 0.5–0.9)
DEPRECATED RDW RBC AUTO: 46.9 FL (ref 36.4–46.3)
EOSINOPHIL # BLD AUTO: 0.31 10*3/UL (ref 0–0.7)
EOSINOPHIL # BLD AUTO: 5.3 % (ref 0–7)
ERYTHROCYTE [DISTWIDTH] IN BLOOD BY AUTOMATED COUNT: 13.2 % (ref 11.7–14.4)
GFR SERPLBLD BASED ON 1.73 SQ M-ARVRAT: 18 ML/MIN/{1.73_M2}
GLOBULIN UR ELPH-MCNC: 2.5 G/DL (ref 2–3.5)
GLUCOSE SERPL-MCNC: 129 MG/DL (ref 65–99)
HCT VFR BLD AUTO: 29.6 % (ref 37–47)
HGB BLD-MCNC: 9.9 G/DL (ref 12–16)
LYMPHOCYTES # BLD AUTO: 0.78 10*3/UL (ref 1–5)
LYMPHOCYTES NFR BLD AUTO: 13.2 % (ref 20–45)
MCH RBC QN AUTO: 32.2 PG (ref 27–31)
MCHC RBC AUTO-ENTMCNC: 33.4 G/DL (ref 33–37)
MCV RBC AUTO: 96.4 FL (ref 81–99)
MONOCYTES # BLD AUTO: 0.43 10*3/UL (ref 0.2–1.2)
MONOCYTES NFR BLD AUTO: 7.3 % (ref 3–10)
NEUTROPHILS # BLD AUTO: 4.3 10*3/UL (ref 2–8)
NEUTROPHILS NFR BLD AUTO: 72.8 % (ref 30–85)
NRBC CBCN: 0 % (ref 0–0.7)
OSMOLALITY SERPL CALC.SUM OF ELEC: 285 MOSM/KG (ref 273–304)
PLATELET # BLD AUTO: 158 10*3/UL (ref 130–400)
PMV BLD AUTO: 11.3 FL (ref 9.4–12.3)
POTASSIUM SERPL-SCNC: 4 MMOL/L (ref 3.5–5.3)
RBC # BLD AUTO: 3.07 10*6/UL (ref 4.2–5.4)
SODIUM SERPL-SCNC: 130 MMOL/L (ref 135–147)
WBC # BLD AUTO: 5.9 10*3/UL (ref 4.8–10.8)

## 2021-02-09 ENCOUNTER — HOSPITAL ENCOUNTER (OUTPATIENT)
Dept: OTHER | Facility: HOSPITAL | Age: 71
Discharge: HOME | End: 2021-02-09
Attending: NURSE PRACTITIONER

## 2021-02-09 LAB
25(OH)D3 SERPL-MCNC: 22.3 NG/ML (ref 30–100)
ALBUMIN SERPL-MCNC: 4.1 G/DL (ref 3.5–5)
ALBUMIN/GLOB SERPL: 1.5 {RATIO} (ref 1.4–2.6)
ALP SERPL-CCNC: 76 U/L (ref 43–160)
ALT SERPL-CCNC: 16 U/L (ref 10–40)
ANION GAP SERPL CALC-SCNC: 18 MMOL/L (ref 8–19)
AST SERPL-CCNC: 22 U/L (ref 15–50)
BASOPHILS # BLD AUTO: 0.07 10*3/UL (ref 0–0.2)
BASOPHILS NFR BLD AUTO: 0.8 % (ref 0–3)
BILIRUB SERPL-MCNC: 0.29 MG/DL (ref 0.2–1.3)
BUN SERPL-MCNC: 42 MG/DL (ref 5–25)
BUN/CREAT SERPL: 22 {RATIO} (ref 6–20)
CALCIUM SERPL-MCNC: 9.1 MG/DL (ref 8.7–10.4)
CHLORIDE SERPL-SCNC: 102 MMOL/L (ref 99–111)
CHOLEST SERPL-MCNC: 205 MG/DL (ref 107–200)
CHOLEST/HDLC SERPL: 4.4 {RATIO} (ref 3–6)
CONV ABS IMM GRAN: 0.19 10*3/UL (ref 0–0.2)
CONV CO2: 22 MMOL/L (ref 22–32)
CONV IMMATURE GRAN: 2.3 % (ref 0–1.8)
CONV TOTAL PROTEIN: 6.8 G/DL (ref 6.3–8.2)
CREAT UR-MCNC: 1.94 MG/DL (ref 0.5–0.9)
DEPRECATED RDW RBC AUTO: 49.7 FL (ref 36.4–46.3)
EOSINOPHIL # BLD AUTO: 0.24 10*3/UL (ref 0–0.7)
EOSINOPHIL # BLD AUTO: 2.9 % (ref 0–7)
ERYTHROCYTE [DISTWIDTH] IN BLOOD BY AUTOMATED COUNT: 13.7 % (ref 11.7–14.4)
FOLATE SERPL-MCNC: 9.9 NG/ML (ref 4.8–20)
GFR SERPLBLD BASED ON 1.73 SQ M-ARVRAT: 25 ML/MIN/{1.73_M2}
GLOBULIN UR ELPH-MCNC: 2.7 G/DL (ref 2–3.5)
GLUCOSE SERPL-MCNC: 84 MG/DL (ref 65–99)
HCT VFR BLD AUTO: 35.7 % (ref 37–47)
HDLC SERPL-MCNC: 47 MG/DL (ref 40–60)
HGB BLD-MCNC: 11.3 G/DL (ref 12–16)
LDLC SERPL CALC-MCNC: 122 MG/DL (ref 70–100)
LYMPHOCYTES # BLD AUTO: 1.95 10*3/UL (ref 1–5)
LYMPHOCYTES NFR BLD AUTO: 23.7 % (ref 20–45)
MCH RBC QN AUTO: 31.6 PG (ref 27–31)
MCHC RBC AUTO-ENTMCNC: 31.7 G/DL (ref 33–37)
MCV RBC AUTO: 99.7 FL (ref 81–99)
MONOCYTES # BLD AUTO: 0.66 10*3/UL (ref 0.2–1.2)
MONOCYTES NFR BLD AUTO: 8 % (ref 3–10)
NEUTROPHILS # BLD AUTO: 5.13 10*3/UL (ref 2–8)
NEUTROPHILS NFR BLD AUTO: 62.3 % (ref 30–85)
NRBC CBCN: 0 % (ref 0–0.7)
OSMOLALITY SERPL CALC.SUM OF ELEC: 296 MOSM/KG (ref 273–304)
PLATELET # BLD AUTO: 292 10*3/UL (ref 130–400)
PMV BLD AUTO: 11 FL (ref 9.4–12.3)
POTASSIUM SERPL-SCNC: 4 MMOL/L (ref 3.5–5.3)
RBC # BLD AUTO: 3.58 10*6/UL (ref 4.2–5.4)
SODIUM SERPL-SCNC: 138 MMOL/L (ref 135–147)
TRIGL SERPL-MCNC: 182 MG/DL (ref 40–150)
TSH SERPL-ACNC: 6.03 M[IU]/L (ref 0.27–4.2)
VIT B12 SERPL-MCNC: 1414 PG/ML (ref 211–911)
VLDLC SERPL-MCNC: 36 MG/DL (ref 5–37)
WBC # BLD AUTO: 8.24 10*3/UL (ref 4.8–10.8)

## 2021-02-18 ENCOUNTER — HOSPITAL ENCOUNTER (OUTPATIENT)
Dept: OTHER | Facility: HOSPITAL | Age: 71
Discharge: HOME | End: 2021-02-18
Attending: NURSE PRACTITIONER

## 2021-02-18 LAB
APPEARANCE UR: ABNORMAL
BILIRUB UR QL: NEGATIVE
COLOR UR: YELLOW
CONV BACTERIA: ABNORMAL
CONV COLLECTION SOURCE (UA): ABNORMAL
CONV UROBILINOGEN IN URINE BY AUTOMATED TEST STRIP: 0.2 {EHRLICHU}/DL (ref 0.1–1)
GLUCOSE UR QL: NEGATIVE MG/DL
HGB UR QL STRIP: ABNORMAL
KETONES UR QL STRIP: ABNORMAL MG/DL
LEUKOCYTE ESTERASE UR QL STRIP: ABNORMAL
NITRITE UR QL STRIP: NEGATIVE
PH UR STRIP.AUTO: 7 [PH] (ref 5–8)
PROT UR QL: ABNORMAL MG/DL
RBC #/AREA URNS HPF: ABNORMAL /[HPF]
SP GR UR: 1.01 (ref 1–1.03)
SQUAMOUS SPT QL MICRO: ABNORMAL /[HPF]
WBC #/AREA URNS HPF: ABNORMAL /[HPF]

## 2021-02-20 LAB
AMPICILLIN SUSC ISLT: >=32
AMPICILLIN+SULBAC SUSC ISLT: 8
BACTERIA UR CULT: ABNORMAL
CEFAZOLIN SUSC ISLT: <=4
CEFEPIME SUSC ISLT: <=0.12
CEFTAZIDIME SUSC ISLT: <=1
CEFTRIAXONE SUSC ISLT: <=0.25
CIPROFLOXACIN SUSC ISLT: <=0.25
ERTAPENEM SUSC ISLT: <=0.12
GENTAMICIN SUSC ISLT: <=1
LEVOFLOXACIN SUSC ISLT: <=0.12
NITROFURANTOIN SUSC ISLT: 64
PIP+TAZO SUSC ISLT: <=4
TMP SMX SUSC ISLT: <=20
TOBRAMYCIN SUSC ISLT: <=1

## 2021-02-27 ENCOUNTER — HOSPITAL ENCOUNTER (OUTPATIENT)
Dept: OTHER | Facility: HOSPITAL | Age: 71
Discharge: HOME | End: 2021-02-27
Attending: NURSE PRACTITIONER

## 2021-03-03 LAB — BACTERIA UR CULT: NORMAL

## 2021-04-16 ENCOUNTER — HOSPITAL ENCOUNTER (OUTPATIENT)
Dept: LAB | Facility: HOSPITAL | Age: 71
Discharge: HOME | End: 2021-04-16
Attending: NURSE PRACTITIONER

## 2021-04-16 LAB
ALBUMIN SERPL-MCNC: 3.8 G/DL (ref 3.5–5)
ALBUMIN/GLOB SERPL: 1.9 {RATIO} (ref 1.4–2.6)
ALP SERPL-CCNC: 44 U/L (ref 43–160)
ALT SERPL-CCNC: 8 U/L (ref 10–40)
ANION GAP SERPL CALC-SCNC: 14 MMOL/L (ref 8–19)
AST SERPL-CCNC: 16 U/L (ref 15–50)
BASOPHILS # BLD AUTO: 0.03 10*3/UL (ref 0–0.2)
BASOPHILS NFR BLD AUTO: 0.6 % (ref 0–3)
BILIRUB SERPL-MCNC: 0.25 MG/DL (ref 0.2–1.3)
BNP SERPL-MCNC: 550 PG/ML (ref 0–900)
BUN SERPL-MCNC: 25 MG/DL (ref 5–25)
BUN/CREAT SERPL: 16 {RATIO} (ref 6–20)
CALCIUM SERPL-MCNC: 9.1 MG/DL (ref 8.7–10.4)
CHLORIDE SERPL-SCNC: 112 MMOL/L (ref 99–111)
CONV ABS IMM GRAN: 0.02 10*3/UL (ref 0–0.2)
CONV CO2: 23 MMOL/L (ref 22–32)
CONV IMMATURE GRAN: 0.4 % (ref 0–1.8)
CONV TOTAL PROTEIN: 5.8 G/DL (ref 6.3–8.2)
CREAT UR-MCNC: 1.55 MG/DL (ref 0.5–0.9)
DEPRECATED RDW RBC AUTO: 55.3 FL (ref 36.4–46.3)
EOSINOPHIL # BLD AUTO: 0.04 10*3/UL (ref 0–0.7)
EOSINOPHIL # BLD AUTO: 0.7 % (ref 0–7)
ERYTHROCYTE [DISTWIDTH] IN BLOOD BY AUTOMATED COUNT: 14.6 % (ref 11.7–14.4)
GFR SERPLBLD BASED ON 1.73 SQ M-ARVRAT: 33 ML/MIN/{1.73_M2}
GLOBULIN UR ELPH-MCNC: 2 G/DL (ref 2–3.5)
GLUCOSE SERPL-MCNC: 90 MG/DL (ref 65–99)
HCT VFR BLD AUTO: 32 % (ref 37–47)
HGB BLD-MCNC: 9.9 G/DL (ref 12–16)
LYMPHOCYTES # BLD AUTO: 1.2 10*3/UL (ref 1–5)
LYMPHOCYTES NFR BLD AUTO: 22.2 % (ref 20–45)
MCH RBC QN AUTO: 31.7 PG (ref 27–31)
MCHC RBC AUTO-ENTMCNC: 30.9 G/DL (ref 33–37)
MCV RBC AUTO: 102.6 FL (ref 81–99)
MONOCYTES # BLD AUTO: 0.48 10*3/UL (ref 0.2–1.2)
MONOCYTES NFR BLD AUTO: 8.9 % (ref 3–10)
NEUTROPHILS # BLD AUTO: 3.64 10*3/UL (ref 2–8)
NEUTROPHILS NFR BLD AUTO: 67.2 % (ref 30–85)
NRBC CBCN: 0 % (ref 0–0.7)
OSMOLALITY SERPL CALC.SUM OF ELEC: 304 MOSM/KG (ref 273–304)
PLATELET # BLD AUTO: 160 10*3/UL (ref 130–400)
PMV BLD AUTO: 11.5 FL (ref 9.4–12.3)
POTASSIUM SERPL-SCNC: 4.1 MMOL/L (ref 3.5–5.3)
RBC # BLD AUTO: 3.12 10*6/UL (ref 4.2–5.4)
SODIUM SERPL-SCNC: 145 MMOL/L (ref 135–147)
WBC # BLD AUTO: 5.41 10*3/UL (ref 4.8–10.8)

## 2021-05-13 NOTE — PROGRESS NOTES
Progress Note      Patient Name: Nicki Gould   Patient ID: [id number]   Sex: Female   YOB: 1950    Primary Care Provider: Patricia MARTIN   Referring Provider: Adam Jones DO    Visit Date: July 14, 2020    Provider: VERONICA Torres   Location: [facility]   Location Address: [address] [place] [address], [address] [place] [address]  [id number]   Location Phone: [phone]          History Of Present Illness  Nicki Gould is a 69 year old /White female who presents for evaluation and treatment of: pt accomp by son. pt having more hallucinations per son. She is followed by gastro for diarrhea which she says has resolved.       Past Medical History  Disease Name Date Onset Notes   Bladder cancer 05/26/2019 --    Bladder Disorder --  --    Bladder mass 04/25/2019 --    Blood clot in vein --  --    Confusion --  --    Dementia --  --    Hematuria 04/13/2019 --    High cholesterol --  --    Human Papilloma Virus Infection --  --    Hypertension, Benign Essential --  --    Migraine --  --    Muscle cramps --  --    Neuropathy --  --    Seasonal allergies --  --    TIA (transient ischemic attack) 2018 --          Past Surgical History  Procedure Name Date Notes   Bladder Surgery --  --    Colonoscopy 2014 --    Hysterectomy 2010 --    TURBT --  --          Medication List  Name Date Started Instructions   cyanocobalamin (vitamin B-12) 1,000 mcg oral tablet  take 1 tablet by oral route daily   donepezil 23 mg oral tablet  take 1 tablet (23 mg) by oral route once daily in the evening   quetiapine 50 mg oral tablet 07/14/2020 take 1 tablet (50 mg) by oral route         Allergy List  Allergen Name Date Reaction Notes   Codeine Phosphate --  --  --    Codeine Sulfate --  --  --    Macrobid --  --  --          Family Medical History  Disease Name Relative/Age Notes   Family history of colon cancer Mother/60s   Mother/60s   Family history of breast cancer   Aunt/80s   Family history of cancer Father/  Mother/   Mother; Father   Family history of heart disease Mother/   Mother  Son   Family history of diabetes mellitus Brother/   Brother         Social History  Finding Status Start/Stop Quantity Notes   Alcohol Never --/-- --  does not drink  drinks no   Alcohol use Never --/-- --  does not drink   Caffeine Unknown 0/0 --  drinks tea; 3-4 times per day    --  --/-- --  --    Retired --  --/-- --  --    Second hand smoke exposure Never 0/0 --  no   Tobacco Former --/-- --  former smoker, smoked 11-20 years  current some days smoker; started smoking at age 38; smoked 4 cigarette(s) per day ocassionally   Tobacco use Former --/-- --  former smoker         Immunizations  NameDate Admin Mfg Trade Name Lot Number Route Inj VIS Given VIS Publication   Hepatitis A04/29/2019 MSD HAVRIX-ADULT [id number] IM RA 04/29/2019    Comments:    Tdap04/29/2019 SKB BOOSTRIX [id number] IM RG 04/29/2019    Comments:          Review of Systems  · Constitutional  o Denies  o : fatigue, night sweats  · Eyes  o Denies  o : double vision, blurred vision  · HENT  o Denies  o : vertigo, recent head injury  · Breasts  o Denies  o : abnormal changes in breast size, additional breast symptoms except as noted in the HPI  · Cardiovascular  o Denies  o : chest pain, irregular heart beats  · Respiratory  o Denies  o : shortness of breath, productive cough  · Gastrointestinal  o Admits  o : had diarrhea in March but she says she's much better. she is eating and drinking fine per pt  o Denies  o : nausea, vomiting,constipation  · Genitourinary  o Denies  o : dysuria, urinary retention  · Integument  o Denies  o : hair growth change, new skin lesions  · Neurologic  o Admits  o : she discussed a child coming into the home from some opening in her kitchen. she went on to say others can't see what she's talking about but she does see kids in the yard and in her house. she was little tearful as she feels her  " tells her she has dementia.stated\" in know I've seen kids.she does call her son sometimes several days a week when she's feeling down  o Denies  o : altered mental status, seizures, dizziness  · Musculoskeletal  o Denies  o : joint swelling, limitation of motion  · Endocrine  o Denies  o : cold intolerance, heat intolerance  · Psychiatric  o Admits  o : hallucinations, sleep disturbance, she does admit to napping some during the day.denies nightmares. she does say her  is very helpful but he does get frustrated she she doesn't remember things  o Denies  o : anxiety, depression, impulsive behaviors, suicidal ideation, excessive anger, withdrawn  · Heme-Lymph  o Denies  o : petechiae, lymph node enlargement or tenderness  · Allergic-Immunologic  o Denies  o : frequent illnesses      Vitals  Date Time BP Position Site L\R Cuff Size HR RR TEMP (F) WT  HT  BMI kg/m2 BSA m2 O2 Sat HC       07/14/2020 10:48 /58 Sitting    53 - R  97 106lbs 6oz 5'   20.77 1.43 96 %          Physical Examination  · Constitutional  o Appearance  o : well-nourished, well developed, alert, in no acute distress  · Head and Face  o Face  o :   § Inspection  § : no facial lesions  · Eyes  o Conjunctivae  o : conjunctivae normal  o Sclerae  o : sclerae white  o Pupils and Irises  o : pupils equal, round, and reactive to light and accommodation bilaterally  o Corneas  o : tear film normal, no lesions present  o Eyelids/Ocular Adnexae  o : eyelid appearance normal, no exudates present, eye moisture level normal  · Ears, Nose, Mouth and Throat  o Ears  o : external ear auricle normal, otic canal normal, TM with no reddness, effusion, retraction  o Nose  o : external normal, nasal mucosa normal, turbinates normal  o Oral Cavity  o : tongue no lesion, oral mucosa normal, she has some tenderness with touch on R side of gum, some fillings and plaque around her teeth, does have missing teeth, no inflammation noted on gums or inside " jaw  o Throat  o : no erythemia, exudate or lesions  · Neck  o Inspection/Palpation  o : normal appearance, no masses or tenderness, trachea midline, no enlarged cervical or supraclavicular lymphnodes palpated  o Thyroid  o : gland size normal, nontender, no nodules or masses present on palpation, thyroid motion normal during swallowing  · Respiratory  o Respiratory Effort  o : breathing unlabored  o Inspection of Chest  o : normal appearance, no retractions  o Auscultation of Lungs  o : normal breath sounds throughout  · Cardiovascular  o Heart  o :   § Auscultation of Heart  § : regular rate and rhythm without murmur  o Peripheral Vascular System  o :   § Carotid Arteries  § : normal pulses bilaterally, no bruits noted  § Pedal Pulses  § : pulses 2 bilaterally  § Extremities  § : top of her Lfoot has some puffiness, no edema in legs  · Gastrointestinal  o Abdominal Examination  o : abdomen nontender to palpation, normal bowel sounds, tone normal without rigidity or guarding, no masses present, abdomen scaphoid upon supine  · Musculoskeletal  o General  o :   § General Musculoskeletal  § : No joint swelling or deformity., Muscle tone, strength, and development grossly normal.  · Skin and Subcutaneous Tissue  o General Inspection  o : no rashes or lesions present, no areas of discoloration  · Neurologic  o Mental Status Examination  o : judgement, insight intact, modd and affect appropriate.given clock test and she did not draw 9:15 or the clock. memory deficit noted, some trouble getting words out. she does answer simple questions appropriately  o Motor Examination  o : strength grossly intact in all four extremities  o Gait and Station  o : normal gait, able to stand without difficulty          Assessment  · Fatigue     780.79/R53.83  · Screening for deficiency anemia     V78.1/Z13.0  · Dementia     294.20/F03.90  · Screening for cardiovascular condition     V81.2/Z13.6  · Screening for thyroid  disorder     V77.0/Z13.29  · Bladder cancer     188.9/C67.9  · Memory deficit     780.93/R41.3      Plan  · Orders  o Physical, Primary Care Panel (CBC, CMP, Lipid, TSH) [facility] (73611, 66829, 68737, 79282) - V78.1/Z13.0, V81.2/Z13.6, V77.0/Z13.29, 780.79/R53.83 - 07/14/2020  o ACO-39: Current medications updated and reviewed () - - 07/14/2020  o B12 Folate levels (B12FO) - - 07/14/2020  · Medications  o quetiapine 50 mg oral tablet   SIG: take 1 tablet (50 mg) by oral route   DISP: (30) tablets with 3 refills  Adjusted on 07/14/2020     o Medications have been Reconciled  o Transition of Care or Provider Policy  · Instructions  o Patient was educated/instructed on their diagnosis, treatment and medications prior to discharge from the clinic today.  o discussed increase in her Seroquel started by psych at [facility].will monitor her hallucinations and change in behavior  · Disposition  o Follow up in 1 month            Electronically Signed by: VERONICA Torres -Author on July 15, 2020 11:25:35 AM

## 2021-05-13 NOTE — PROGRESS NOTES
Progress Note      Patient Name: Nicki Gould   Patient ID: [id number]   Sex: Female   YOB: 1950    Primary Care Provider: Patricia MARTIN   Referring Provider: Adam Jones DO    Visit Date: August 3, 2020    Provider: Mar Smith MD   Location: [facility]   Location Address: [address] [place] [address], [address]  [place], [place]  [id number]   Location Phone: [phone]          Chief Complaint  · Follow Up Diarrhea      History Of Present Illness     Ms. Gould is a 69 year old female with h/o bladder cancer s/p surgery (9/23/19) who presents for f/u of abdominal pain and diarrhea.  Diarrhea resolved.  She reports formed bowel movement daily.  No melena, hematochezia.  Weight stable.  No nausea, vomiting.  She reports she was changed from Colestid to cholestyramine b/c pill was too difficult to swallow.    She is also followed by Dr. Sosa at [facility].  Colonoscopy 9/2013 per patient.    Labs (12/11/19): WBC 6.2, Hgb 9.8, Plt 170, AST 19, ALT 12, alk phos 53, TB < 0.15.    CT abd/pelvis w/o (12/11/19): mild wall thickening is present within the sigmoid colon and the rectum likely related to a nonspecific colitis.  O/w, no acute intra-abdominal or intrapelvic process.>       Past Medical History  Bladder cancer; Bladder Disorder; Bladder mass; Blood clot in vein; Confusion; Dementia; Hematuria; High cholesterol; Human Papilloma Virus Infection; Hypertension, Benign Essential; Migraine; Muscle cramps; Neuropathy; Seasonal allergies; TIA (transient ischemic attack)         Past Surgical History  Bladder Surgery; Colonoscopy; Hysterectomy; TURBT         Medication List  cyanocobalamin (vitamin B-12) 1,000 mcg oral tablet; Depakote 125 mg oral tablet,delayed release (/EC); donepezil 23 mg oral tablet; quetiapine 50 mg oral tablet         Allergy List  Codeine Phosphate; Codeine Sulfate; Macrobid       Allergies Reconciled  Family Medical  History  Family history of colon cancer; Family history of breast cancer; Family history of cancer; Family history of heart disease; Family history of diabetes mellitus         Social History  Alcohol (Never); Alcohol use (Never); Caffeine (Unknown); ; Retired; Second hand smoke exposure (Never); Tobacco (Former); Tobacco use (Former)         Review of Systems  · Constitutional  o Denies  o : chills, fever  · Cardiovascular  o Denies  o : chest pain  · Respiratory  o Denies  o : shortness of breath  · Gastrointestinal  o Admits  o : see HPI   · Endocrine  o Denies  o : weight gain, weight loss      Vitals  Date Time BP Position Site L\R Cuff Size HR RR TEMP (F) WT  HT  BMI kg/m2 BSA m2 O2 Sat HC       08/03/2020 09:11 AM 94/60 Sitting    63 - R  98.3 101lbs 16oz 5'   19.92 1.4           Physical Examination  · Constitutional  o Appearance  o : Healthy-appearing, awake and alert in no acute distress  · Head and Face  o Head  o : Normocephalic with no worriesome skin lesions  · Eyes  o Vision  o :   § Visual Fields  § : eyes move symmetrical in all directions  o Sclerae  o : sclerae anicteric  · Neck  o Inspection/Palpation  o : Trachea is midline, no adenopathy  · Respiratory  o Respiratory Effort  o : Breathing is unlabored.  o Inspection of Chest  o : normal appearance  o Auscultation of Lungs  o : Chest is clear to auscultation bilaterally.  · Cardiovascular  o Heart  o :   § Auscultation of Heart  § : no murmurs, rubs, or gallops  o Peripheral Vascular System  o :   § Extremities  § : no cyanosis, clubbing or edema;   · Gastrointestinal  o Abdominal Examination  o : Abdomen is soft, nontender to palpation, with normal active bowel sounds, no appreciable hepatosplenomegaly.          Assessment  · Diarrhea     787.91/R19.7    Problems Reconciled  Plan  · Medications  o Medications have been Reconciled  o Transition of Care or Provider Policy  · Instructions  o I d/w patient again recommendations for  colonoscopy given change in bowel movements and previous abnormal CT. I explained that I cannot exclude the possibility of something more worrisome such as a malignancy. Pt still reports she does not feel she can undergo any procedures at this time. Declines colonoscopy.   o OK to continue cholestyramine.  · Disposition  o Follow up in 6 months.            Electronically Signed by: Mar Smith MD -Author on August 5, 2020 10:01:50 PM

## 2021-05-13 NOTE — PROGRESS NOTES
Progress Note      Patient Name: Nicki Gould   Patient ID: [id number]   Sex: Female   YOB: 1950    Primary Care Provider: Patricia MARTIN   Referring Provider: Adam Jones DO    Visit Date: August 14, 2020    Provider: VERONICA Torres   Location: [facility]   Location Address: [address] [place] [address], [address] [place] [address]  [id number]   Location Phone: [phone]          Chief Complaint  · f/u      History Of Present Illness  TELEHEALTH TELEPHONE VISIT  Nicki Gould is a 69 year old /White female who is presenting for evaluation via telehealth telephone visit. Verbal consent obtained before beginning visit. accompanied by .   Provider spent 15 minutes with patient during telehealth visit.   The following staff were present during this visit: Flor BRAY   Past Medical History/Overview of Patient Symptoms      Review of Systems  · Gastrointestinal  o Admits  o : she says she is eating and drinking well  · Psychiatric  o Admits  o : pt says she is feeling better as they have moved and the kids aren't bothering her anymore. Son has called and she does have days when she will be worried or paranoid and cry to him.  is very supportive, she says she has been sleeping much better  o Denies  o : depression, excessive anger          Assessment  · Hallucinations     780.1/R44.3  · Bladder cancer     188.9/C67.9  · Confusion     298.9/R41.0    Problems Reconciled  Plan  · Orders  o ACO-39: Current medications updated and reviewed () - - 08/14/2020  o Physician Telephone Evaluation, 11-20 minutes ([id number]) - 780.1/R44.3, 188.9/C67.9, 298.9/R41.0 - 08/14/2020  · Medications  o Depakote 125 mg oral tablet,delayed release (DR/EC)   SIG: take 1 tablet (125 mg) by oral route once daily for 90 days   DISP: (90) tablets with 1 refills  Adjusted on 08/14/2020     o Medications have been Reconciled  o Transition of Care or Provider  Policy  · Instructions  o Plan Of Care:   o Take all medications as prescribed/directed.  · Disposition  o Follow Up PRN  o Follow up in 3 months            Electronically Signed by: VERONICA Torres -Author on August 14, 2020 04:49:28 PM

## 2021-05-15 VITALS
DIASTOLIC BLOOD PRESSURE: 65 MMHG | HEIGHT: 60 IN | WEIGHT: 102.31 LBS | HEART RATE: 67 BPM | SYSTOLIC BLOOD PRESSURE: 139 MMHG | TEMPERATURE: 96.5 F | BODY MASS INDEX: 20.09 KG/M2 | OXYGEN SATURATION: 91 %

## 2021-05-15 VITALS
TEMPERATURE: 96.4 F | RESPIRATION RATE: 21 BRPM | SYSTOLIC BLOOD PRESSURE: 142 MMHG | HEIGHT: 60 IN | WEIGHT: 115.31 LBS | OXYGEN SATURATION: 96 % | DIASTOLIC BLOOD PRESSURE: 62 MMHG | HEART RATE: 66 BPM | BODY MASS INDEX: 22.64 KG/M2

## 2021-05-15 VITALS
HEART RATE: 53 BPM | DIASTOLIC BLOOD PRESSURE: 58 MMHG | SYSTOLIC BLOOD PRESSURE: 137 MMHG | BODY MASS INDEX: 20.88 KG/M2 | TEMPERATURE: 97 F | OXYGEN SATURATION: 96 % | HEIGHT: 60 IN | WEIGHT: 106.37 LBS

## 2021-05-15 VITALS
HEART RATE: 68 BPM | DIASTOLIC BLOOD PRESSURE: 40 MMHG | HEIGHT: 60 IN | SYSTOLIC BLOOD PRESSURE: 117 MMHG | WEIGHT: 126 LBS | BODY MASS INDEX: 24.74 KG/M2

## 2021-05-15 VITALS
DIASTOLIC BLOOD PRESSURE: 62 MMHG | HEIGHT: 61 IN | OXYGEN SATURATION: 98 % | WEIGHT: 126.31 LBS | HEART RATE: 66 BPM | BODY MASS INDEX: 23.85 KG/M2 | TEMPERATURE: 96.9 F | SYSTOLIC BLOOD PRESSURE: 141 MMHG

## 2021-05-15 VITALS
BODY MASS INDEX: 20.47 KG/M2 | DIASTOLIC BLOOD PRESSURE: 62 MMHG | HEIGHT: 60 IN | SYSTOLIC BLOOD PRESSURE: 119 MMHG | WEIGHT: 104.25 LBS | HEART RATE: 78 BPM

## 2021-05-15 VITALS
HEART RATE: 63 BPM | HEIGHT: 60 IN | SYSTOLIC BLOOD PRESSURE: 94 MMHG | BODY MASS INDEX: 20.03 KG/M2 | DIASTOLIC BLOOD PRESSURE: 60 MMHG | WEIGHT: 102 LBS | TEMPERATURE: 98.3 F

## 2021-05-15 VITALS
BODY MASS INDEX: 20.47 KG/M2 | HEIGHT: 60 IN | HEART RATE: 70 BPM | DIASTOLIC BLOOD PRESSURE: 64 MMHG | SYSTOLIC BLOOD PRESSURE: 151 MMHG | OXYGEN SATURATION: 98 % | WEIGHT: 104.25 LBS

## 2021-05-15 VITALS — WEIGHT: 121 LBS | BODY MASS INDEX: 23.75 KG/M2 | HEIGHT: 60 IN | RESPIRATION RATE: 15 BRPM

## 2021-05-15 VITALS — HEIGHT: 60 IN | RESPIRATION RATE: 16 BRPM | WEIGHT: 126 LBS | BODY MASS INDEX: 24.74 KG/M2

## 2021-05-15 VITALS — BODY MASS INDEX: 24.94 KG/M2 | WEIGHT: 127 LBS | RESPIRATION RATE: 14 BRPM | HEIGHT: 60 IN

## 2021-05-22 ENCOUNTER — TRANSCRIBE ORDERS (OUTPATIENT)
Dept: ADMINISTRATIVE | Facility: HOSPITAL | Age: 71
End: 2021-05-22

## 2021-05-22 DIAGNOSIS — C67.9 MALIGNANT NEOPLASM OF URINARY BLADDER, UNSPECIFIED SITE (HCC): Primary | ICD-10-CM

## 2021-05-28 VITALS
OXYGEN SATURATION: 100 % | TEMPERATURE: 98 F | DIASTOLIC BLOOD PRESSURE: 60 MMHG | SYSTOLIC BLOOD PRESSURE: 135 MMHG | HEART RATE: 76 BPM | WEIGHT: 103.62 LBS | BODY MASS INDEX: 20.24 KG/M2 | RESPIRATION RATE: 16 BRPM

## 2021-05-28 VITALS
RESPIRATION RATE: 16 BRPM | HEART RATE: 61 BPM | BODY MASS INDEX: 20.54 KG/M2 | TEMPERATURE: 97.8 F | SYSTOLIC BLOOD PRESSURE: 129 MMHG | DIASTOLIC BLOOD PRESSURE: 58 MMHG | WEIGHT: 105.16 LBS | OXYGEN SATURATION: 100 %

## 2021-05-28 NOTE — PROGRESS NOTES
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 Verbal consent obtained before beginning visit.            Provider spent (11) minutes with the patient during telehealth visit.            The following staff were present during the visit: (none)                         Past Med History      Patient presents via telehealth for follow-up of bladder cancer.  She is status     post treatments as outlined.  She states she is doing okay from a bladder     standpoint.  She still has occasional incontinence but feels like she is dealing     with it adequately.  She denies dysuria, hematuria.  No new masses     lymphadenopathy.  No unusual aches or pains.  Her energy level is low but     adequate for her ADLs.  Her appetite is not as good as it used to be but she     denies weight loss.      Overview of Symptoms      Patient: NICKI BIRD   Acct: #[id number]   Report:     #[id number]            UNIT #: [id number]    DOS: 21       INSURANCE:MEDICARE PART A   LOCATION:[facility]     : 1950            PROVIDERS      ADMITTING:     ATTENDING: CRIS DAAM      FAMILY:  EVA WINTER   ORDERING:  CRIS ADAM         OTHER:    DICTATING:  GEORGE BOBBY MD            REQ  #:[id number]   EXAM:CTAPCWOC - CT ABD PEL CHEST wo      REASON FOR EXAM:  MALIGNANT NEOPLASM OF BLADDER      REASON FOR VISIT:  MALIGNANT NEOPLASM OF BLADDER            *******Signed******         PROCEDURE:   CT ABDOMEN PELVIS CHEST WITHOUT             COMPARISON:   [facility], CT, CT CHEST ABD PEL W CONTRAST,     7/27/2020, 10:20.             INDICATIONS:   FOLLOW UP BLADDER CA. NO COMPLAINTS. NO IV CONTRAST DUE TO GFR 26     CREATININE 1.9        1-25-21.             TECHNIQUE:   CT images were obtained without the administration of intravenous     contrast material.             PROTOCOL:     Standard imaging protocol performed                RADIATION:     DLP: 548.7mGy*cm          Automated exposure control was utilized to minimize radiation dose.       LABS:     eGFR: 26ml/min/1.73m2             FINDINGS:         Chest:  No suspicious pulmonary nodules.             No adenopathy in the chest.             Abdomen:  No liver or splenic lesion on this unenhanced study.  Kidneys, adrenal     glands, pancreas,       gallbladder have an unremarkable unenhanced appearance.  Bowel loops are     nondilated.  Appendix       normal.  No hydronephrosis.             Pelvis:  No pelvic mass or fluid.  No aggressive appearing bone change.             CONCLUSION:   No convincing evidence for active malignancy in the chest,     abdomen, or pelvis.              GEORGE BOBBY MD             Electronically Signed and Approved By: GEORGE BOBBY MD on 1/25/2021 at 12:54                               Until signed, this is an unconfirmed preliminary report that may contain      errors and is subject to change.                                              АЛЕКСАНДР:      D:01/25/21 1254            Most Recent Lab Findings            Item Value  Date Time             White Blood Count 7.17 10*3/uL 1/8/21 1130             Hemoglobin 11.0 g/dL L 1/8/21 1130             Hematocrit 35.2 % L 1/8/21 1130             Mean Corpuscular  Volume 101.1 fL H 1/8/21 1130             Platelet Count 212 10*3/uL 1/8/21 1130             Granulocytes # 5.01 10*3/uL 1/8/21 1130             Sodium Level 143 mmol/L 1/8/21 1130             Potassium Level 3.9 mmol/L 1/8/21 1130             Chloride Level 109 mmol/L 1/8/21 1130             Carbon Dioxide Level 22 mmol/L 1/8/21 1130             Anion Gap 16 mmol/L 1/8/21 1130             Blood Urea Nitrogen 26 mg/dL H 1/8/21 1130             Creatinine 1.68 mg/dL H 1/8/21 1130             Glucose Level 105 mg/dL H 1/8/21 1130             Aspartate Amino Transf (AST/SGOT) 23 U/L 1/8/21 1130             Total Bilirubin 0.33 mg/dL 1/8/21 1130             Alanine Aminotransferase (ALT/SGPT) 14 U/L 1/8/21 1130             Alkaline Phosphatase 59 U/L 1/8/21 1130             Total Protein 6.9 g/dL 1/8/21 1130             Albumin 4.3 g/dL 1/8/21 1130            Allergies/Medications      Allergies:        Coded Allergies:             CODEINE (Verified  Adverse Reaction, Unknown, HALLUCINATIONS, 1/27/21)      Medications    Last Reconciled on 1/27/21 12:36 by CRIS ADAM      Apixaban (Eliquis) 5 Mg Tablet      5 MG PO BID for 90 Days, #180 TAB 1 Refill         Prov: CRIS ADAM         8/12/20       Apixaban (Eliquis) 5 Mg Tablet      5 MG PO BID for 60 Days, #120 TAB         Prov: CRIS ADAM         8/12/20       risperiDONE (risperiDONE) 0.25 Mg Tablet      0.25 MG PO HS, TAB         Reported         3/31/19       Donepezil Hcl (Donepezil*) 5 Mg Tablet      5 MG PO HS, TAB         Reported         3/31/19            Plan/Instructions      Ambulatory Assessment/Plan:        Bladder cancer         Malignant neoplasm of urinary bladder, unspecified site         Bladder location: unspecified site      Status post treatment as outlined.  Patient is doing well.  I see no evidence of     disease recurrence by history, physical examination, recent lab work or scans.      Creatinine remains mildly elevated but stable.   RTC 6 months for ongoing     surveillance with labs and scans prior.            Notes      New Diagnostics      * CT Abd/Pelvis/Chest W/Contrast, 6 Months         Dx: Bladder cancer - C67.9      * CBC With Auto Diff, 6 Months         Dx: Bladder cancer - C67.9      * CMP Comp Metabolic Panel, 6 Months         Dx: Bladder cancer - C67.9      Plan/Instructions            * Plan Of Care: ()            * Chronic conditions reviewed and taken into consideration for today's treatment       plan.      * Patient instructed to seek medical attention urgently for new or worsening       symptoms.      * Patient was educated/instructed on their diagnosis, treatment and medications       prior to discharge from the clinic today.      Codes:  Phone Eval 11-20 mi [id number]            Electronically signed by CRIS ADAM  01/27/2021 12:36       Disclaimer: Converted document may not contain table formatting or lab diagrams. Please see [employer] for the authenticated document.

## 2021-05-28 NOTE — PROGRESS NOTES
Patient: NICKI BIRD     Acct: [id number]     Report: #[id number]  UNIT #: [id number]     : 1950    Encounter Date:2020  PRIMARY CARE:   ***Signed***  --------------------------------------------------------------------------------------------------------------------  TELEHEALTH NOTE      History of Present Illness            Chief Complaint: (Bladder cancer)            Nicki Melvin is presenting for evaluation via Telehealth vi    sit. Verbal consent obtained before beginning visit.            Provider spent (12) minutes with the patient during telehealth visit.            The following staff were present during the visit: (None)                         Past Med History      Muscle invasive bladder cancer status post neoadjuvant chemotherapy followed by     resection.  Now on surveillance      Overview of Symptoms      Patient states that she has been doing fairly well since her last visit.  She     continues to have some incontinence of urine.  She has not seen urology lately.      She denies hematuria, dysuria or changes in her urine appearance.  She reports     occasional stomach discomfort which seems to occur if she does not eat.  After     eating, her symptoms do get better.  She reports normal bowel habits.  She     denies nausea or vomiting.  She denies any masses lymphadenopathy.  No unusual a    ches or pains.  Her energy level is lower than she would like but adequate for     her daily needs.            Most Recent Lab Findings      Laboratory Tests      4/15/20 06:48            Allergies/Medications      Allergies:        Coded Allergies:             CODEINE (Verified  Adverse Reaction, Unknown, HALLUCINATIONS, 20)      Medications    Last Reconciled on 20 12:45 by CRIS ADAM      risperiDONE (risperiDONE) 0.25 Mg Tablet      0.25 MG PO HS, TAB         Reported         3/31/19       Donepezil Hcl (Donepezil*) 5 Mg Tablet      5 MG PO  HS, TAB         Reported         3/31/19            Plan/Instructions      Ambulatory Assessment/Plan:        Bladder cancer         Malignant neoplasm of urinary bladder, unspecified site - C67.9         Bladder location: unspecified site            Notes      Patient seems to be doing okay from the cancer standpoint.  I will asked the d    ietitian to contact her to see if we can maximize her nutrition and fluid intake     given the slight increase in her creatinine and stomach issues.  I recommended     she touch base with her urologist regarding the ongoing incontinence.  I     discussed pelvic floor therapy which may be of some utility.  CBC continues to     demonstrate mild anemia but this is improving and should continue to do so with     time.  I will plan to see her back in 3 months for ongoing surveillance with     labs and scans prior.      New Diagnostics      * CBC With Auto Diff, 3 Months         Dx: Malignant neoplasm of urinary bladder, unspecified site - C67.9      * CMP Comp Metabolic Panel, 3 Months         Dx: Malignant neoplasm of urinary bladder, unspecified site - C67.9      * CT Abd/Pelvis/Chest W/Contrast, 3 Months         Dx: Malignant neoplasm of urinary bladder, unspecified site - C67.9      Plan/Instructions            * Plan Of Care: (Bladder cancer)            * Chronic conditions reviewed and taken into consideration for today's treatment      plan.      * Patient instructed to seek medical attention urgently for new or worsening       symptoms.      * Patient was educated/instructed on their diagnosis, treatment and medications       prior to discharge from the clinic today.      Codes:  Phone Eval 11-20 mi [id number]            Electronically signed by CRIS ADAM  04/21/2020 09:36       Disclaimer: Converted document may not contain table formatting or lab diagrams. Please see [employer] for the authenticated document.

## 2021-05-28 NOTE — PROGRESS NOTES
Patient: AZAR BIRD     Acct: [id number]     Report: #[id number]  UNIT #: [id number]     : 1950    Encounter Date:2020  PRIMARY CARE:   ***Signed***  --------------------------------------------------------------------------------------------------------------------  NURSE INTAKE      Visit Type      Established Patient Visit            Chief Complaint      BLADDER CANCER            Referring Provider/Copies To      Referring Provider:  EMILEE PERES      Primary Care Provider:  EARNESTINE HALL      Copies To:   EARNESTINE HALL            History and Present Illness      Past Oncology Illness History      Mrs. Tierra Melvin is a 67yo WF recently dx with bladder ca.  Pt reports    she noticed some hematuria around 3/25/19--went to PCP 3xs in one wk and was     treated for UTI with couple of PO antbxs--she reports occasional LLQ discomfort;    however, she denies acute pain.  PCP referred pt to urologist, Dr. Flores and      TURBT was performed 5/15/19 with unfortunate perforation of the bladder.      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  Pt has been referred to Med, Dr. Sosa, and     has an appt 19 to discuss sx options.    PSH-full hysterectomy d/t     precancerous cells.  PMH: high cholesterol.  FMH: mother colon and father-    metastatic with unknown etiology.  Pt reports smoking many yrs ago; however will    occasionally have a cigarette when extremely stressed out.  Pt is accompanied by    daughter and spouse. 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      19 underwent robot assisted lap radical cystectomy, bilateral pelvic     lymphadenectomy with orthotopic ileal neobladder creation per Dr. Sosa @ [facility].     Pathology: all negative for carcinoma. GATA3 immunohistochemical stain was     performed and failed to demonstrate residual viable  tumor cells.  Specimen i    ncluded: left/right ureters   bladder--nodes:  right and left obturator, rt/left external, rt/left common,     rt/left presacral.  No adj chemotherapy required.            HPI - Oncology Interim      1) Rt lateral wall-high grade invasive urothelial carcinoma:             Ms. Melvin presents for 3 month follow up to review scans and labs from     7/27/20. She is status post neoadjuvant chemotherapy in September 2019 and     followed by resection. She reports in the interim, she has been having     incontinent issues with her bladder. Recent labs do show a stable anemia     present. She denies any GI bleeding, hematuria. In addition, she has gained a     small amount of weight since her last visit. In addition, she does appear to     have some CKD present with her labs and GFR of 26 which is likely the cause of     her chronic anemia. Her CT does show a  focal thrombus in the right common     femoral vein area. She has had previous DVT in the left popliteal and tibial     veins in the past which did warrant use of Eliquis.              In addition, she does have some memory issues present today. I do see where she     has atrophy and vascular changes present in the brain. She is taking Donepezil.             2) DVT: right common femoral vein: This is a second occurrence for DVT. She will    need lifelong anti-coagulation.            Cancer Details            Rt lateral wall-high grade invasive urothelial carcinoma            Clinical Staging      Stage (T3N0M0)            Treatments      Chemotherapy      6/19/19 thru 9/11/19 completed 4C neoadjuvant Gemzar         Clinical Trial Participant      No            ECOG Performance Status      0            Most Recent Lab Findings      Laboratory Tests      7/27/20 10:41            Most Recent Imaging Findings      [facility]                PACS RADIOLOGY REPORT            Patient:  AZAR BIRD   Acct: #[id number]   Report:     #[id number]            UNIT #: [id number]    DOS: 20 0910      INSURANCE:MEDICARE PART A   LOCATION:CT     : 1950            PROVIDERS      ADMITTING:     ATTENDING: CRIS ADAM      FAMILY:  EARNESTINE HALL [facility]   ORDERING:  CRIS ADAM         OTHER:    DICTATING:  GEORGE BOBBY MD            REQ #:[id number]   EXAM:CTACPWC - CT ABD CHEST PEL w CONTR      REASON FOR EXAM:  BLADDER CA      REASON FOR VISIT:  BLADDER CA            *******Signed******         PROCEDURE:   CT ABDOMEN; CT CHEST; CT PELVIS WITH CONTRAST             COMPARISON:   [facility], CT, CT CHEST ABD PEL W CONTRAST,    2020, 9:29.             INDICATIONS:   BLADDER CA             TECHNIQUE:   After obtaining the patient's consent, CT images were obtained with    intravenous contrast       material.             FINDINGS:         Chest:  Lungs are clear.  No adenopathy in the chest.  No aggressive appearing     bone change.             Abdomen:  Liver, spleen, adrenal glands, pancreas, gallbladder unremarkable.      Bowel loops are       nondilated.  Appendix is normal.  Patient is status post urinary diversion.      Slight prominence of       the renal collecting systems is unchanged.  No abdominal adenopathy.             Pelvis:  Status post cystectomy.  No pelvic mass or fluid.  No pelvic     adenopathy.             Focal thrombus seen in the right common femoral vein.  No aggressive appearing     bone change.             CONCLUSION:   Status post cystectomy with urinary diversion.             No convincing evidence for recurrent disease or active metastatic disease in the    chest, abdomen, or       pelvis.             Focal thrombus seen in the right common femoral vein.               GEORGE BOBBY MD             Electronically Signed and Approved By: GEORGE BOBBY MD on 2020 at 10:46            PAST, FAMILY   Past Medical  History      Past Medical History:  High Cholesterol, Hypertension, Stroke      Hematology/Oncology (F):  Anemia, Bladder Cancer            Past Surgical History      Bladder Surgery, Hysterectomy            Family History      Family History:  Colorectal Cancer, Liver Cancer            BLADDER SURGERY            Social History      Marital Status:        Lives independently:  Yes      Number of Children:  2      Occupation:  RETIRED            Tobacco Use      Tobacco status:  Current every day smoker      Currently Vaping:  No            Alcohol Use      Alcohol intake:  None            Substance Use      Substance use:  Denies use            REVIEW OF SYSTEMS      General:  Denies: Appetite Change, Fatigue, Fever, Night Sweats, Weight Gain,     Weight Loss      Eye:  Denies Blurred Vision, Denies Corrective Lenses, Denies Diplopia, Denies     Vision Changes      ENT:  Denies Headache, Denies Hearing Loss, Denies Hoarseness, Denies Sore     Throat      Cardiovascular:  Denies Chest Pain, Denies Palpitations      Respiratory:  Denies: Cough, Coughing Blood, Productive Cough, Shortness of Air,    Wheezing      Gastrointestinal:  Denies Bloody Stools, Denies Constipation, Denies Diarrhea,     Denies Nausea/Vomiting, Denies Problem Swallowing, Denies Unable to Control     Bowels      Genitourinary:  Denies Blood in Urine; Admits Incontinence; Denies Painful     Urination      Musculoskeletal:  Denies Back Pain, Denies Muscle Pain, Denies Painful Joints      Integumentary:  Denies Itching, Denies Lesions, Denies Rash      Neurologic:  Denies Dizziness, Denies Numbness\Tingling, Denies Seizures      Other      memory loss      Psychiatric:  Admits Anxiety; Denies Depression      Other      memory loss      Endocrine:  Denies Cold Intolerance, Denies Heat Intolerance      Hematologic/Lymphatic:  Denies Bruising, Denies Bleeding, Denies Enlarged Lymph     Nodes      Reproductive:  Admits: Menopause;           Denies: Heavy Periods, Pregnant, Still Menstruating            VITAL SIGNS AND SCORES      Vitals      Weight 105 lbs 2.551 oz / 47.7 kg      Temperature 97.8 F / 36.56 C - Temporal      Pulse 61      Respirations 16      Blood Pressure 129/58 Sitting, Left Arm      Pulse Oximetry 100%, ROOM AIR            Pain Score      Experiencing any pain?:  No      Pain Scale Used:  Numerical      Pain Intensity:  0            Fatigue Score      Experiencing any fatigue?:  No      Fatigue (0-10 scale):  0 (none)            PT/OT Functional Screening      No needs identified            Speech Functional Screening      No needs identified            Rehab to be Ordered      Type of Referral to be Ordered:  No needs identified            EXAM      General appearance:  in no apparent distress, cooperative, appears stated age.      HEENT: No pallor, no icterus, oral mucosa moist      Neck: Supple, trachea central-not deviated      Lymph nodes: none palpable peripherally      Cardiovascular: S1-S2 heard, no murmurs, no rubs, no gallops.      Breast exam:      Respiratory: Clear to auscultation bilaterally, no adventitious sounds      Abdomen/gastro: Soft, nontender, no palpable hepatosplenomegaly, bowel sounds     heard      Skin: No lesions, no rashes, no petechiae.      Extremities: No pedal edema, peripheral pulses felt, no clubbing      Musculoskeletal: Normal tone, no rigidity of muscles; range of motion intact      Spine: No deformities      Psychiatric: Alert and oriented x3, appropriate affect, intact judgment      Neurologic: Cranial nerves II through XII grossly intact, no gross neurological     deficits noted.            PREVENTION      Hx Influenza Vaccination:  Yes      Date Influenza Vaccine Given:  Oct 1, 2019      Influenza Vaccine Declined:  No      2 or More Falls in Past Year?:  Yes      Fall Past Year with Injury?:  No      Hx Pneumococcal Vaccination:  No      Encouraged to follow-up with:  PCP regarding  preventative exams.      Chart initiated by      ROOPA ENRIQUEZ MA            ALLERGY/MEDS      Allergies      Coded Allergies:             CODEINE (Verified  Adverse Reaction, Unknown, HALLUCINATIONS, 7/28/20)            Medications      Last Reconciled on 7/28/20 12:52 by HUMBERTO HOWARD      Apixaban (Eliquis) 5 Mg Tablet      5 MG PO BID for 60 Days, #120 TAB         Prov: HUMBERTO HOWARD onc         7/28/20       risperiDONE (risperiDONE) 0.25 Mg Tablet      0.25 MG PO HS, TAB         Reported         3/31/19       Donepezil Hcl (Donepezil*) 5 Mg Tablet      5 MG PO HS, TAB         Reported         3/31/19      Medications Reviewed:  No Changes made to meds            IMPRESSION/PLAN      Impression      1) Rt lateral wall-high grade invasive urothelial carcinoma:             Ms. Melvin presents for 3 month follow up to review scans and labs from     7/27/20. She is status post neoadjuvant chemotherapy in September 2019 and     followed by resection. She reports in the interim, she has been having     incontinent issues with her bladder. Recent labs do show a stable anemia     present. She denies any GI bleeding, hematuria. In addition, she has gained a     small amount of weight since her last visit. In addition, she does appear to     have some CKD present with her labs and GFR of 26 which is likely the cause of     her chronic anemia. Her CT does show a  focal thrombus in the right common     femoral vein area. She has had previous DVT in the left popliteal and tibial     veins in the past which did warrant use of Eliquis.  Thankfully, there is no     evidence of any metastatic disease present on her scans. This was reviewed with     the patient.             In addition, she does have some memory issues present today. I do see where she     has atrophy and vascular changes present in the brain. She is taking Donepezil.             2) DVT: right common femoral vein on most recent CT of [facility] dated  7/27/20: This     is a second occurrence for DVT. She will need lifelong anti-coagulation. I have     sent Eliquis to her pharmacy to start loading dose with 5 mg ( 2 tabs) BID x 7     days then decrease to 1 tab BID.            Diagnosis      Bladder cancer         Malignant neoplasm of urinary bladder, unspecified site         Bladder location: unspecified site            Notes      New Medications      * Apixaban (Eliquis) 5 MG TABLET: 5 MG PO BID 60 Days #120         Instructions: 2 tabs (10mg) BID x 7 days, then 1 tab BID.      New Diagnostics      * CBC With Auto Diff, 3 Months         Dx: Bladder cancer - C67.9      * CMP Comp Metabolic Panel, 3 Months         Dx: Bladder cancer - C67.9      * LDH, Routine         Dx: Bladder cancer - C67.9            Plan      1) Reviewed labs and scans.             She will need labs in 3 months with follow up with Dr. Reeves.             2) She will need lifelong anti-coagulation. Eliqus 5 mg ( 2 tabs) BID x 7 days     then decrease to 1 tab BID dosing. She will need refills after 1 month.            Pain      Pain Zero Today            Advanced Care Plan Discussion      Declines Discussion 1124F            Patient Education            Deep Vein Thrombosis      Dementia      Urinary Incontinence -- Female      Patient Education Provided:  Yes            Electronically signed by HUMBERTO HOWARD onc  07/28/2020 12:52       Disclaimer: Converted document may not contain table formatting or lab diagrams. Please see [employer] for the authenticated document.

## 2021-05-28 NOTE — PROGRESS NOTES
Patient: NICKI BIRD     Acct: [id number]     Report: #[id number]  UNIT #: [id number]     : 1950    Encounter Date:10/28/2020  PRIMARY CARE:   ***Signed***  --------------------------------------------------------------------------------------------------------------------  TELEHEALTH NOTE      Past Oncology Illness History      Cancer Details            Rt lateral wall-high grade invasive urothelial carcinoma            Clinical Staging      Stage (T3N0M0)            Treatments      Chemotherapy      19 thru 19 completed 4C neoadjuvant Gemzar         History of Present Illness            Chief Complaint: (UROTHELIAL CARCINOMA)            Nicki Melvin is presenting for evaluation via Telehealth visit    by phone. Verbal consent obtained before beginning visit.            Provider spent (12) minutes with the patient during telehealth visit.            The following staff were present during the visit: (none)                         Overview of Symptoms      Patient presents via telehealth for follow-up of her bladder cancer.  She is     status post treatments as outlined including chemotherapy and resection with     neobladder reconstruction.  She states that she has been doing okay.  She is     tired as they have been in the midst of moving but her energy level is generally     good.  She has incontinence of urine on occasion especially with changes in     position or straining.  She has discussed this with her urologist and he does     not feel that additional intervention would be of utility.  She denies any     masses or lymphadenopathy.  She denies hematuria.  She is eating drinking well,     her weight is maintained.  She notes some memory changes and has been on     medications for the same.  She inquires about  over-the-counter memory     supplements.            Most Recent Lab Findings      Laboratory Tests      10/21/20 12:10             Allergies/Medications      Allergies:        Coded Allergies:             CODEINE (Verified  Adverse Reaction, Unknown, HALLUCINATIONS, 10/28/20)      Medications    Last Reconciled on 10/28/20 16:52 by CRIS ADAM      Apixaban (Eliquis) 5 Mg Tablet      5 MG PO BID for 90 Days, #180 TAB 1 Refill         Prov: CRIS ADAM         8/12/20       Apixaban (Eliquis) 5 Mg Tablet      5 MG PO BID for 60 Days, #120 TAB         Prov: CRIS ADAM         8/12/20       risperiDONE (risperiDONE) 0.25 Mg Tablet      0.25 MG PO HS, TAB         Reported         3/31/19       Donepezil Hcl (Donepezil*) 5 Mg Tablet      5 MG PO HS, TAB         Reported         3/31/19            Plan/Instructions      Ambulatory Assessment/Plan:        Bladder cancer         Malignant neoplasm of urinary bladder, unspecified site - C67.9         Bladder location: unspecified site            Notes      Patient is doing well.  I see no evidence of disease recurrence by history or     recent lab work.  Her renal function is improving.  She should follow-up with     urology regarding the incontinence from her neobladder to see if there is     anything additional that may help.  We discussed pelvic floor physical therapy     although I am uncertain the utility of that in the face of a neobladder versus     an intact bladder.  I will plan to see her back in 3 months time for ongoing     surveillance with CT scans and labs prior.  From my standpoint it is okay to try     over-the-counter memory supplements if she chooses.  I did suggest that she     follow-up with her neurologist.  Also told her about the dementia and memory     clinic at [facility] which has quite a good reputation in the area.      New Diagnostics      * CT Abd/Pelvis/Chest W/Contrast, 3 Months         Dx: Malignant neoplasm of urinary bladder, unspecified site - C67.9      * CBC With Auto Diff, 3 Months         Dx: Malignant neoplasm of urinary bladder,  unspecified site - C67.9      * CMP Comp Metabolic Panel, 3 Months         Dx: Malignant neoplasm of urinary bladder, unspecified site - C67.9      Plan/Instructions            * Plan Of Care: ()            * Chronic conditions reviewed and taken into consideration for today's treatment       plan.      * Patient instructed to seek medical attention urgently for new or worsening       symptoms.      * Patient was educated/instructed on their diagnosis, treatment and medications       prior to discharge from the clinic today.      Codes:  Phone Eval 11-20 mi [id number]            Electronically signed by CRIS ADAM  10/28/2020 16:52       Disclaimer: Converted document may not contain table formatting or lab diagrams. Please see [employer] for the authenticated document.

## 2021-05-28 NOTE — PROGRESS NOTES
Patient: AZAR BIRD     Acct: [id number]     Report: #[id number]  UNIT #: [id number]     : 1950    Encounter Date:2020  PRIMARY CARE:   ***Signed***  --------------------------------------------------------------------------------------------------------------------  NURSE INTAKE      Visit Type      Established Patient Visit            Chief Complaint      3 MOF/U            Referring Provider/Copies To      Referring Provider:  EMILEE PERES      Primary Care Provider:  EARNESTINE HALL      Copies To:   EARNESTINE HALL            History and Present Illness      Past Oncology Illness History      Mrs. Tierra Melvin is a 67yo WF recently dx with bladder ca.  Pt reports    she noticed some hematuria around 3/25/19--went to PCP 3xs in one wk and was     treated for UTI with couple of PO antbxs--she reports occasional LLQ discomfort;    however, she denies acute pain.  PCP referred pt to urologist, Dr. Flores and      TURBT was performed 5/15/19 with unfortunate perforation of the bladder.      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  Pt has been referred to Med, Dr. Sosa, and     has an appt 19 to discuss sx options.    PSH-full hysterectomy d/t     precancerous cells.  PMH: high cholesterol.  FMH: mother colon and father-    metastatic with unknown etiology.  Pt reports smoking many yrs ago; however will    occasionally have a cigarette when extremely stressed out.  Pt is accompanied by    daughter and spouse. 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      19 underwent robot assisted lap radical cystectomy, bilateral pelvic     lymphadenectomy with orthotopic ileal neobladder creation per Dr. Sosa @ [facility].     Pathology: all negative for carcinoma. GATA3 immunohistochemical stain was     performed and failed to demonstrate residual viable tumor  cells.  Specimen     included: left/right ureters   bladder--nodes:  right and left obturator, rt/left external, rt/left common,     rt/left presacral.  No adj chemotherapy required.            HPI - Oncology Interim      Patient returns for follow-up of bladder cancer.  She states that she has been     doing well since her last visit.  Her only concern is that she is going to     urinate frequently since her bladder resection and reconstruction.  She denies     pain with urination, foul-smelling urine, hematuria but just goes frequently.      She is eating and drinking well, her weight is maintained.  She notes good     energy level.  She denies any masses lymphadenopathy.            Cancer Details            Rt lateral wall-high grade invasive urothelial carcinoma            Clinical Staging      Stage (T3N0M0)            Treatments      Chemotherapy      19 thru 19 completed 4C neoadjuvant Gemzar         Clinical Trial Participant      No            ECOG Performance Status      0            Most Recent Imaging Findings      Patient: AZAR BIRD   Acct: #[id number]   Report:     #[id number]            UNIT #: [id number]    DOS: 20 0900      INSURANCE:MEDICARE PART A   LOCATION:[facility]     : 1950            PROVIDERS      ADMITTING:     ATTENDING: TEJA GREENFIELD      FAMILY:  EARNESTINE HALL [facility]   ORDERING:  TEJA GREENFIELD         OTHER:    DICTATING:  RAMO CARMONA MD            REQ #:[id number]   EXAM:CTACPWC - CT ABD CHEST PEL w CONTR      REASON FOR EXAM:        REASON FOR VISIT:  BLADDER CA            *******Signed******         PROCEDURE:   CT ABDOMEN; CT CHEST; CT PELVIS WITH CONTRAST             COMPARISON:   [facility], CT, CT CHEST ABD PEL W CONTRAST,    2019, 11:07.        [facility], CT, ABD PEL W/O CONTRAST, 2019, 19:36.             INDICATIONS:   BLADDER CANCER DX 2019, URGENCY, URINE INCONTINENCE, LOW ABDOMEN      PAIN, NON SMOKER,       CREAT 1.2  1/16/20, GFR 44             TECHNIQUE:   After obtaining the patient's consent, CT images were obtained with    intravenous contrast       material.      PROTOCOL:     Standard imaging protocol performed                RADIATION:     DLP: 682.8mGy*cm          Automated exposure control was utilized to minimize radiation dose.       CONTRAST:   100cc Isovue 370 I.V.      LABS:     eGFR: 44ml/min/1.73m2             FINDINGS:         CHEST:      Visualized lower neck without acute abnormality.  There is a small nonspecific 5    mm left thyroid       lobe nodule likely of no clinical relevance.  Heart size is normal.  Coronary     calcifications noted.        Negative for pericardial effusion.  No central pulmonary embolus.  Scattered me    diastinal lymph       nodes are below size criteria.               Trachea and mainstem bronchi are patent.  There is no consolidation or findings     of pneumonia.        There are few small calcified pulmonary nodules related to granulomatous     disease.  No suspicious       pulmonary nodule or mass.  The bony thorax is intact.             ABDOMEN AND PELVIS:        Liver and spleen are normal in size and contour.  Normal adrenal glands.      Pancreas without findings       of pancreatitis.  Gallbladder is present.  No pericholecystic inflammation.  The    kidneys       demonstrate symmetric enhancement.  No hydronephrosis or obstructive uropathy.      Postsurgical       changes of interval cystectomy with ileal neobladder formation.  Ureters are     medially deviated       likely postoperative.  No significant ureteral dilatation.             Negative for pneumoperitoneum.  Large amount of stool in the rectum may relate     to constipation.        Contrast reaches the level rectum.  No small bowel obstruction.  The portal     vein, splenic vein and       superior mesenteric vein are patent.  There is moderate atherosclerotic      calcification involving the       aortoiliac vasculature.  On venous phase images there is a central filling     defect in the inferior       mesenteric vein on image 33 however this appears to resolve on the delayed phase    which suggests       venous mixing rather than a thrombus.  The appendix is normal.  There is no     pelvic sidewall or       iliac chain adenopathy by size criteria.  No aggressive osseous lesion or acute     fracture.  Uterus       absent.  No adnexal mass.             COMBINED IMPRESSION:         1. No findings of intrathoracic metastatic disease.      2. Postsurgical changes of cystectomy with ileal neobladder formation as above.     No hydronephrosis.              3. No abdominal or pelvic adenopathy or evidence of distant metastatic disease.      4. Additional chronic CT findings above.              RAMO CARMONA MD             Electronically Signed and Approved By: RAMO CARMONA MD on 1/16/2020 at 10:36                        Until signed, this is an unconfirmed preliminary report that may contain      errors and is subject to change.                                              RAMOM:      D:01/16/20 1036            PAST, FAMILY   Past Medical History      Past Medical History:  High Cholesterol, Hypertension, Stroke      Hematology/Oncology (F):  Anemia, Bladder Cancer            Past Surgical History      Bladder Surgery, Hysterectomy            Family History      Family History:  Colorectal Cancer, Liver Cancer            BLADDER SURGERY            Social History      Marital Status:        Lives independently:  Yes      Number of Children:  2      Occupation:  RETIRED            Tobacco Use      Tobacco status:  Current every day smoker            Alcohol Use      Alcohol intake:  None            Substance Use      Substance use:  Denies use            REVIEW OF SYSTEMS      General:  Admits: Fatigue      Eye:  Admits Corrective Lenses      ENT:  Admits Hearing Loss       Cardiovascular:  Denies Chest Pain      Respiratory:  Denies: Productive Cough, Shortness of Air      Gastrointestinal:  Denies Diarrhea, Denies Nausea/Vomiting      Musculoskeletal:  Denies Muscle Pain      Neurologic:  Denies Numbness\Tingling            VITAL SIGNS AND SCORES      Vitals      Weight 103 lbs 9.859 oz / 47 kg      Temperature 98 F / 36.67 C - Temporal      Pulse 76      Respirations 16      Blood Pressure 135/60 Sitting, Left Arm      Pulse Oximetry 100%, RM AIR            Pain Score      Pain Scale Used:  Numerical            Fatigue Score      Fatigue (0-10 scale):  2            EXAM      General Appearance:  Positive for: Alert, Oriented x3, Cooperative;          Negative for: Acute Distress      Eye:  Positive for: Anicteric Sclerae, Moist Conjunctiva      Neck:  Positive for: Supple;          Negative for: JVD, Masses      Respiratory:  Positive for: CTAB, Normal Respiratory Effort      Abdomen/Gastro:  Positive for: Normal Active Bowel Sounds, Soft;          Negative for: Distention, Hepatosplenomegaly, Tenderness      Cardiovascular:  Positive for: RRR;          Negative for: Gallop, Murmur, Peripheral Edema, Rub      Psychiatric:  Positive for: Appropriate Affect, Intact Judgement      Lymphatic:  Negative for: Cervical, Infraclavicular, Supraclavicular            PREVENTION      Hx Influenza Vaccination:  Yes      Date Influenza Vaccine Given:  Oct 1, 2019      2 or More Falls Past Year?:  No      Fall Past Year with Injury?:  No      Hx Pneumococcal Vaccination:  No      Encouraged to follow-up with:  PCP regarding preventative exams.      Chart initiated by      HARJINDER FERNANDEZ MA            ALLERGY/MEDS      Allergies      Coded Allergies:             CODEINE (Verified  Adverse Reaction, Unknown, HALLUCINATIONS, 10/16/19)            Medications      Last Reconciled on 1/22/20 12:45 by CRIS ADAM      risperiDONE (risperiDONE) 0.25 Mg Tablet      0.25 MG PO HS, TAB         Reported          3/31/19       Donepezil Hcl (Donepezil*) 5 Mg Tablet      5 MG PO HS, TAB         Reported         3/31/19      Medications Reviewed:  Changes made to meds            IMPRESSION/PLAN      Diagnosis      Bladder cancer         Malignant neoplasm of lateral wall of urinary bladder         Bladder location: lateral wall      Status post neoadjuvant chemotherapy followed by resection.  Patient is doing     well.  I see no evidence of disease recurrence by history, physical examination     or CT scan.  I will see her back in 3 months time for ongoing surveillance with     labs prior.  I will plan to repeat CT scans at the 6-month interval.            Notes      Discontinued Medications      * Prochlorperazine Maleate 10 MG TAB: 10 MG PO Q6H PRN NAUSEA AND/OR VOMITING       #60      * ONDANSETRON HCL 8 MG TABLET: 8 MG PO Q8H PRN NAUSEA #60      * Apixaban (Eliquis) 5 MG TABLET: 5 MG PO BID 30 Days #60      New Diagnostics      * CBC With Auto Diff, 3 Months         Dx: Bladder cancer - C67.9      * CMP Comp Metabolic Panel, 3 Months         Dx: Bladder cancer - C67.9            Patient Education            Eat Well, Exercise Well, Be Well: Dietary and Fitness Guidelines            Electronically signed by CRIS ADAM  01/22/2020 12:45       Disclaimer: Converted document may not contain table formatting or lab diagrams. Please see [email or web address] for the authenticated document.